# Patient Record
Sex: MALE | Race: WHITE | Employment: FULL TIME | URBAN - METROPOLITAN AREA
[De-identification: names, ages, dates, MRNs, and addresses within clinical notes are randomized per-mention and may not be internally consistent; named-entity substitution may affect disease eponyms.]

---

## 2018-10-25 ENCOUNTER — OFFICE VISIT (OUTPATIENT)
Dept: FAMILY MEDICINE | Facility: CLINIC | Age: 63
End: 2018-10-25
Payer: COMMERCIAL

## 2018-10-25 VITALS
TEMPERATURE: 98 F | HEART RATE: 80 BPM | SYSTOLIC BLOOD PRESSURE: 130 MMHG | BODY MASS INDEX: 33.13 KG/M2 | DIASTOLIC BLOOD PRESSURE: 78 MMHG | WEIGHT: 231.4 LBS | HEIGHT: 70 IN | RESPIRATION RATE: 16 BRPM

## 2018-10-25 DIAGNOSIS — Z00.00 WELL ADULT EXAM: ICD-10-CM

## 2018-10-25 DIAGNOSIS — Z23 NEED FOR PROPHYLACTIC VACCINATION AND INOCULATION AGAINST INFLUENZA: ICD-10-CM

## 2018-10-25 DIAGNOSIS — R94.4 ABNORMAL RENAL FUNCTION TEST: Primary | ICD-10-CM

## 2018-10-25 DIAGNOSIS — Z98.890 STATUS POST GASTRIC SURGERY: ICD-10-CM

## 2018-10-25 DIAGNOSIS — Z11.59 NEED FOR HEPATITIS C SCREENING TEST: ICD-10-CM

## 2018-10-25 DIAGNOSIS — Z12.5 SCREENING FOR PROSTATE CANCER: ICD-10-CM

## 2018-10-25 DIAGNOSIS — K21.9 GASTROESOPHAGEAL REFLUX DISEASE WITHOUT ESOPHAGITIS: Primary | ICD-10-CM

## 2018-10-25 LAB
ALBUMIN SERPL-MCNC: 3.6 G/DL (ref 3.4–5)
ALP SERPL-CCNC: 74 U/L (ref 40–150)
ALT SERPL W P-5'-P-CCNC: 50 U/L (ref 0–70)
ANION GAP SERPL CALCULATED.3IONS-SCNC: 6 MMOL/L (ref 3–14)
AST SERPL W P-5'-P-CCNC: 25 U/L (ref 0–45)
BASOPHILS # BLD AUTO: 0.1 10E9/L (ref 0–0.2)
BASOPHILS NFR BLD AUTO: 0.7 %
BILIRUB SERPL-MCNC: 0.6 MG/DL (ref 0.2–1.3)
BUN SERPL-MCNC: 21 MG/DL (ref 7–30)
CALCIUM SERPL-MCNC: 8.4 MG/DL (ref 8.5–10.1)
CHLORIDE SERPL-SCNC: 105 MMOL/L (ref 94–109)
CHOLEST SERPL-MCNC: 186 MG/DL
CO2 SERPL-SCNC: 26 MMOL/L (ref 20–32)
CREAT SERPL-MCNC: 1.65 MG/DL (ref 0.66–1.25)
DIFFERENTIAL METHOD BLD: NORMAL
EOSINOPHIL # BLD AUTO: 0.1 10E9/L (ref 0–0.7)
EOSINOPHIL NFR BLD AUTO: 1.6 %
ERYTHROCYTE [DISTWIDTH] IN BLOOD BY AUTOMATED COUNT: 14.4 % (ref 10–15)
FOLATE SERPL-MCNC: 43.3 NG/ML
GFR SERPL CREATININE-BSD FRML MDRD: 42 ML/MIN/1.7M2
GLUCOSE SERPL-MCNC: 83 MG/DL (ref 70–99)
HCT VFR BLD AUTO: 43.9 % (ref 40–53)
HDLC SERPL-MCNC: 34 MG/DL
HGB BLD-MCNC: 14.5 G/DL (ref 13.3–17.7)
IRON SERPL-MCNC: 91 UG/DL (ref 35–180)
LDLC SERPL CALC-MCNC: 121 MG/DL
LYMPHOCYTES # BLD AUTO: 1.8 10E9/L (ref 0.8–5.3)
LYMPHOCYTES NFR BLD AUTO: 23.9 %
MCH RBC QN AUTO: 27.6 PG (ref 26.5–33)
MCHC RBC AUTO-ENTMCNC: 33 G/DL (ref 31.5–36.5)
MCV RBC AUTO: 84 FL (ref 78–100)
MONOCYTES # BLD AUTO: 0.7 10E9/L (ref 0–1.3)
MONOCYTES NFR BLD AUTO: 8.8 %
NEUTROPHILS # BLD AUTO: 4.9 10E9/L (ref 1.6–8.3)
NEUTROPHILS NFR BLD AUTO: 65 %
NONHDLC SERPL-MCNC: 152 MG/DL
PLATELET # BLD AUTO: 267 10E9/L (ref 150–450)
POTASSIUM SERPL-SCNC: 5 MMOL/L (ref 3.4–5.3)
PROT SERPL-MCNC: 7.4 G/DL (ref 6.8–8.8)
PSA SERPL-ACNC: 3.37 UG/L (ref 0–4)
PTH-INTACT SERPL-MCNC: 96 PG/ML (ref 18–80)
RBC # BLD AUTO: 5.25 10E12/L (ref 4.4–5.9)
SODIUM SERPL-SCNC: 137 MMOL/L (ref 133–144)
TRIGL SERPL-MCNC: 156 MG/DL
VIT B12 SERPL-MCNC: 334 PG/ML (ref 193–986)
WBC # BLD AUTO: 7.5 10E9/L (ref 4–11)

## 2018-10-25 PROCEDURE — 83970 ASSAY OF PARATHORMONE: CPT | Performed by: NURSE PRACTITIONER

## 2018-10-25 PROCEDURE — 82306 VITAMIN D 25 HYDROXY: CPT | Performed by: NURSE PRACTITIONER

## 2018-10-25 PROCEDURE — 85025 COMPLETE CBC W/AUTO DIFF WBC: CPT | Performed by: NURSE PRACTITIONER

## 2018-10-25 PROCEDURE — 80061 LIPID PANEL: CPT | Performed by: NURSE PRACTITIONER

## 2018-10-25 PROCEDURE — 83540 ASSAY OF IRON: CPT | Performed by: NURSE PRACTITIONER

## 2018-10-25 PROCEDURE — 99386 PREV VISIT NEW AGE 40-64: CPT | Mod: 25 | Performed by: NURSE PRACTITIONER

## 2018-10-25 PROCEDURE — G0103 PSA SCREENING: HCPCS | Performed by: NURSE PRACTITIONER

## 2018-10-25 PROCEDURE — 99213 OFFICE O/P EST LOW 20 MIN: CPT | Mod: 25 | Performed by: NURSE PRACTITIONER

## 2018-10-25 PROCEDURE — 80053 COMPREHEN METABOLIC PANEL: CPT | Performed by: NURSE PRACTITIONER

## 2018-10-25 PROCEDURE — 36415 COLL VENOUS BLD VENIPUNCTURE: CPT | Performed by: NURSE PRACTITIONER

## 2018-10-25 PROCEDURE — 82746 ASSAY OF FOLIC ACID SERUM: CPT | Performed by: NURSE PRACTITIONER

## 2018-10-25 PROCEDURE — 82607 VITAMIN B-12: CPT | Performed by: NURSE PRACTITIONER

## 2018-10-25 PROCEDURE — 90682 RIV4 VACC RECOMBINANT DNA IM: CPT | Performed by: NURSE PRACTITIONER

## 2018-10-25 PROCEDURE — 90471 IMMUNIZATION ADMIN: CPT | Performed by: NURSE PRACTITIONER

## 2018-10-25 PROCEDURE — 86803 HEPATITIS C AB TEST: CPT | Performed by: NURSE PRACTITIONER

## 2018-10-25 ASSESSMENT — ENCOUNTER SYMPTOMS: MYALGIAS: 1

## 2018-10-25 ASSESSMENT — PAIN SCALES - GENERAL: PAINLEVEL: NO PAIN (0)

## 2018-10-25 NOTE — MR AVS SNAPSHOT
After Visit Summary   10/25/2018    Leandro Hodge    MRN: [id number]           Patient Information     Date Of Birth          1955        Visit Information        Provider Department      10/25/2018 9:00 AM Karlene Holder APRN [facility]        Today's Diagnoses     Gastroesophageal reflux disease without esophagitis    -  1    Well adult exam        Screening for prostate cancer        Need for hepatitis C screening test        Status post gastric surgery          Care Instructions      Go to pharmacy for shingles vaccine     Preventive Health Recommendations  Male Ages 50 - 64    Yearly exam:             See your health care provider every year in order to  o   Review health changes.   o   Discuss preventive care.    o   Review your medicines if your doctor has prescribed any.     Have a cholesterol test every 5 years, or more frequently if you are at risk for high cholesterol/heart disease.     Have a diabetes test (fasting glucose) every three years. If you are at risk for diabetes, you should have this test more often.     Have a colonoscopy at age 50, or have a yearly FIT test (stool test). These exams will check for colon cancer.      Talk with your health care provider about whether or not a prostate cancer screening test (PSA) is right for you.    You should be tested each year for STDs (sexually transmitted diseases), if you re at risk.     Shots: Get a flu shot each year. Get a tetanus shot every 10 years.     Nutrition:    Eat at least 5 servings of fruits and vegetables daily.     Eat whole-grain bread, whole-wheat pasta and brown rice instead of white grains and rice.     Get adequate Calcium and Vitamin D.     Lifestyle    Exercise for at least 150 minutes a week (30 minutes a day, 5 days a week). This will help you control your weight and prevent disease.     Limit alcohol to one drink per day.     No smoking.     Wear sunscreen to prevent skin  "cancer.     See your dentist every six months for an exam and cleaning.     See your eye doctor every 1 to 2 years.            Follow-ups after your visit        Who to contact     Normal or non-critical lab and imaging results will be communicated to you by MyChart, letter or phone within 4 business days after the clinic has received the results. If you do not hear from us within 7 days, please contact the clinic through MyChart or phone. If you have a critical or abnormal lab result, we will notify you by phone as soon as possible.  Submit refill requests through [employer] or call your pharmacy and they will forward the refill request to us. Please allow 3 business days for your refill to be completed.          If you need to speak with a  for additional information , please call: [phone]           Additional Information About Your Visit        Care EveryWhere ID     This is your Care EveryWhere ID. This could be used by other organizations to access your [facility] medical records  [id number]        Your Vitals Were     Pulse Temperature Respirations Height BMI (Body Mass Index)       80 98  F (36.7  C) (Tympanic) 16 5' 9.5\" (1.765 m) 33.68 kg/m2        Blood Pressure from Last 3 Encounters:   10/25/18 130/78    Weight from Last 3 Encounters:   10/25/18 231 lb 6.4 oz (105 kg)              We Performed the Following     CBC with platelets differential     Comprehensive metabolic panel     Folate     Hepatitis C Screen Reflex to HCV RNA Quant and Genotype     Iron     Lipid panel reflex to direct LDL Fasting     Parathyroid Hormone Intact     Prostate spec antigen screen     Vitamin B12     Vitamin D Deficiency          Today's Medication Changes          These changes are accurate as of 10/25/18  9:49 AM.  If you have any questions, ask your nurse or doctor.               These medicines have changed or have updated prescriptions.        Dose/Directions    * OMEPRAZOLE PO   This may have " changed:  Another medication with the same name was added. Make sure you understand how and when to take each.   Changed by:  Karlene Holder APRN CNP        Dose:  20 mg   Take 20 mg by mouth   Refills:  0       * omeprazole 20 MG CR capsule   Commonly known as:  priLOSEC   This may have changed:  You were already taking a medication with the same name, and this prescription was added. Make sure you understand how and when to take each.   Used for:  Gastroesophageal reflux disease without esophagitis   Changed by:  Karlene Holder APRN CNP        Take 1 tab every other day and as needed   Quantity:  90 capsule   Refills:  1       * Notice:  This list has 2 medication(s) that are the same as other medications prescribed for you. Read the directions carefully, and ask your doctor or other care provider to review them with you.         Where to get your medicines      These medications were sent to [id number] - [place] [[facility] [place] - [address]  [address] [address]     Phone:  [phone]     omeprazole 20 MG CR capsule                Primary Care Provider Office Phone # Fax #    [facility] [phone] [address] [facility] [address]        Equal Access to Services     YIN ZHANG : Hadii mireille barono Sogelyali, waaxda luqadaha, qaybta kaalmada adeegyada, tami marshall. So [facility] [phone].    ATENCIÓN: Si habla español, tiene a jones disposición servicios gratuitos de asistencia lingüística. Llame al [phone].    We comply with applicable federal civil rights laws and [place] laws. We do not discriminate on the basis of race, color, national origin, age, disability, sex, sexual orientation, or gender identity.            Thank you!     Thank you for choosing [facility]  for your care. Our goal is always to provide you with excellent care. Hearing back from our  patients is one way we can continue to improve our services. Please take a few minutes to complete the written survey that you may receive in the mail after your visit with us. Thank you!             Your Updated Medication List - Protect others around you: Learn how to safely use, store and throw away your medicines at www.disposemymeds.org.          This list is accurate as of 10/25/18  9:49 AM.  Always use your most recent med list.                   Brand Name Dispense Instructions for use Diagnosis    * OMEPRAZOLE PO      Take 20 mg by mouth        * omeprazole 20 MG CR capsule    priLOSEC    90 capsule    Take 1 tab every other day and as needed    Gastroesophageal reflux disease without esophagitis       VITAMIN B-12 PO           VITAMIN D PO           * Notice:  This list has 2 medication(s) that are the same as other medications prescribed for you. Read the directions carefully, and ask your doctor or other care provider to review them with you.

## 2018-10-25 NOTE — LETTER
October 29, 2018      Leandro Hodge  [address] [facility] [address] [facility] [address]        Dear ,    We are writing to inform you of your test results.Your parathyroid hormone is a bit elevated.  I am still waiting for your vitamin D level to return.  If your vitamin D level is on the lower end of a normal that could be the reason for the elevation in your parathyroid hormone. Your vitamin B12 and folate are normal.     Resulted Orders   CBC with platelets differential   Result Value Ref Range    WBC 7.5 4.0 - 11.0 10e9/L    RBC Count 5.25 4.4 - 5.9 10e12/L    Hemoglobin 14.5 13.3 - 17.7 g/dL    Hematocrit 43.9 40.0 - 53.0 %    MCV 84 78 - 100 fl    MCH 27.6 26.5 - 33.0 pg    MCHC 33.0 31.5 - 36.5 g/dL    RDW 14.4 10.0 - 15.0 %    Platelet Count 267 150 - 450 10e9/L    % Neutrophils 65.0 %    % Lymphocytes 23.9 %    % Monocytes 8.8 %    % Eosinophils 1.6 %    % Basophils 0.7 %    Absolute Neutrophil 4.9 1.6 - 8.3 10e9/L    Absolute Lymphocytes 1.8 0.8 - 5.3 10e9/L    Absolute Monocytes 0.7 0.0 - 1.3 10e9/L    Absolute Eosinophils 0.1 0.0 - 0.7 10e9/L    Absolute Basophils 0.1 0.0 - 0.2 10e9/L    Diff Method Automated Method    Comprehensive metabolic panel   Result Value Ref Range    Sodium 137 133 - 144 mmol/L    Potassium 5.0 3.4 - 5.3 mmol/L    Chloride 105 94 - 109 mmol/L    Carbon Dioxide 26 20 - 32 mmol/L    Anion Gap 6 3 - 14 mmol/L    Glucose 83 70 - 99 mg/dL      Comment:      Fasting specimen    Urea Nitrogen 21 7 - 30 mg/dL    Creatinine 1.65 (H) 0.66 - 1.25 mg/dL    GFR Estimate 42 (L) >60 mL/min/1.7m2      Comment:      Non  GFR Calc    GFR Estimate If Black 51 (L) >60 mL/min/1.7m2      Comment:       GFR Calc    Calcium 8.4 (L) 8.5 - 10.1 mg/dL    Bilirubin Total 0.6 0.2 - 1.3 mg/dL    Albumin 3.6 3.4 - 5.0 g/dL    Protein Total 7.4 6.8 - 8.8 g/dL    Alkaline Phosphatase 74 40 - 150 U/L    ALT 50 0 - 70 U/L    AST 25 0 - 45 U/L   Prostate spec antigen screen    Result Value Ref Range    PSA 3.37 0 - 4 ug/L      Comment:      Assay Method:  Chemiluminescence using Siemens Vista analyzer   Lipid panel reflex to direct LDL Fasting   Result Value Ref Range    Cholesterol 186 <200 mg/dL    Triglycerides 156 (H) <150 mg/dL      Comment:      Borderline high:  150-199 mg/dl  High:             200-499 mg/dl  Very high:       >499 mg/dl  Fasting specimen      HDL Cholesterol 34 (L) >39 mg/dL    LDL Cholesterol Calculated 121 (H) <100 mg/dL      Comment:      Above desirable:  100-129 mg/dl  Borderline High:  130-159 mg/dL  High:             160-189 mg/dL  Very high:       >189 mg/dl      Non HDL Cholesterol 152 (H) <130 mg/dL      Comment:      Above Desirable:  130-159 mg/dl  Borderline high:  160-189 mg/dl  High:             190-219 mg/dl  Very high:       >219 mg/dl     Folate   Result Value Ref Range    Folate 43.3 >5.4 ng/mL   Iron   Result Value Ref Range    Iron 91 35 - 180 ug/dL   Vitamin B12   Result Value Ref Range    Vitamin B12 334 193 - 986 pg/mL   Parathyroid Hormone Intact   Result Value Ref Range    Parathyroid Hormone Intact 96 (H) 18 - 80 pg/mL       If you have any questions or concerns, please call the clinic at the number listed above.       Sincerely,        Karlene Holder APRN CNP/ aj

## 2018-10-25 NOTE — Clinical Note
Please abstract the following data from this visit with this patient into the appropriate field in Epic:  Colonoscopy done on this date: 4/1/2010 (approximately), by this group: unsure, results were normal repeat 10 years.

## 2018-10-25 NOTE — LETTER
October 30, 2018      Leandro Hodge  [address] [facility] [address]            Leandro,     Your blood test for Hep C is negative.     Your vitamin D level is on the lower end of normal.  I would recommend taking over-the-counter vitamin D3 2000 units daily.      Component      Latest Ref Rng & Units 10/25/2018   Hepatitis C Antibody      NR:Nonreactive Nonreactive   Vitamin D Deficiency screening      20 - 75 ug/L 30       If you have any questions or concerns, please call the clinic at the number listed above.       Sincerely,        YOBANY Argueta CNP/ag

## 2018-10-25 NOTE — PROGRESS NOTES

## 2018-10-25 NOTE — PATIENT INSTRUCTIONS
Go to pharmacy for shingles vaccine     Preventive Health Recommendations  Male Ages 50 - 64    Yearly exam:             See your health care provider every year in order to  o   Review health changes.   o   Discuss preventive care.    o   Review your medicines if your doctor has prescribed any.     Have a cholesterol test every 5 years, or more frequently if you are at risk for high cholesterol/heart disease.     Have a diabetes test (fasting glucose) every three years. If you are at risk for diabetes, you should have this test more often.     Have a colonoscopy at age 50, or have a yearly FIT test (stool test). These exams will check for colon cancer.      Talk with your health care provider about whether or not a prostate cancer screening test (PSA) is right for you.    You should be tested each year for STDs (sexually transmitted diseases), if you re at risk.     Shots: Get a flu shot each year. Get a tetanus shot every 10 years.     Nutrition:    Eat at least 5 servings of fruits and vegetables daily.     Eat whole-grain bread, whole-wheat pasta and brown rice instead of white grains and rice.     Get adequate Calcium and Vitamin D.     Lifestyle    Exercise for at least 150 minutes a week (30 minutes a day, 5 days a week). This will help you control your weight and prevent disease.     Limit alcohol to one drink per day.     No smoking.     Wear sunscreen to prevent skin cancer.     See your dentist every six months for an exam and cleaning.     See your eye doctor every 1 to 2 years.

## 2018-10-25 NOTE — PROGRESS NOTES
SUBJECTIVE:   CC: Leandro Hodge is an 63 year old male who presents for preventative health visit.     Chief Complaint   Patient presents with     Physical     pt is fasting       Healthy Habits:    Do you get at least three servings of calcium containing foods daily (dairy, green leafy vegetables, etc.)? no, taking calcium and/or vitamin D supplement: yes - takes vitamin D3 supplement    Amount of exercise or daily activities, outside of work: none    Problems taking medications regularly No    Medication side effects: No    Have you had an eye exam in the past two years? yes    Do you see a dentist twice per year? yes    Do you have sleep apnea, excessive snoring or daytime drowsiness?no       Today's PHQ-2 Score:   PHQ-2 ( 1999 Pfizer) 10/25/2018   Q1: Little interest or pleasure in doing things 0   Q2: Feeling down, depressed or hopeless 0   PHQ-2 Score 0       Abuse: Current or Past(Physical, Sexual or Emotional)- No  Do you feel safe in your environment - Yes    Social History   Substance Use Topics     Smoking status: Former Smoker     Smokeless tobacco: Former User     Alcohol use Yes      Comment: very little      If you drink alcohol do you typically have >3 drinks per day or >7 drinks per week? No                      Last PSA: No results found for: PSA    Reviewed orders with patient. Reviewed health maintenance and updated orders accordingly - Yes  Current Outpatient Prescriptions   Medication Sig Dispense Refill     Cholecalciferol (VITAMIN D PO)        Cyanocobalamin (VITAMIN B-12 PO)        omeprazole (PRILOSEC) 20 MG CR capsule Take 1 tab every other day and as needed 90 capsule 1     OMEPRAZOLE PO Take 20 mg by mouth       No Known Allergies    Reviewed and updated as needed this visit by clinical staff  Tobacco  Allergies  Meds  Problems  Med Hx  Surg Hx  Fam Hx  Soc Hx          Reviewed and updated as needed this visit by Provider  Problems        Here today for physical and to  establish care.  Is fasting today for labs.  Has not had a physical in some time.  In 1976 had a gastric surgery for weight loss.  Has been having more muscle cramps.  Started taking vitamin B and vitamin D and the muscle cramps have decreased significantly but are still there occasionally.    Takes omeprazole every other day. Rarely has any burning. Will take Tums if needed.  Started it initially was having burning every day.    Last PSA: Never. No family history of prostate cancer.   Last Colonoscopy: 2010. Thinks was normal. [place] [address] [facility]. No family history of colon cancer  Last eye exam: every 1-2 years   Last dental exam: every 6 mo  Last tetanus vaccine: 2011  Last influenza vaccine: Plans to get here today   Last shingles vaccine: Never   Last pneumonia vaccine: Never   Hep C screen (born [birth year]): Do today    HIV screen: Never, declines   AAA screen (age 65-78 with smoking hx): N/A  IVD (HTN, Hyperlipid, Smoking): N/A  Lung CA screening (55-80, 30 pk smoking hx): N/A    ROS:  Review of Systems   Constitutional: Negative for diaphoresis, fatigue and fever.   HENT: Negative for congestion, ear pain, postnasal drip, rhinorrhea, sinus pressure and sore throat.    Eyes: Negative for discharge.   Respiratory: Negative for cough, chest tightness, shortness of breath and wheezing.    Cardiovascular: Negative for chest pain and palpitations.   Gastrointestinal: Negative for blood in stool, diarrhea, nausea and vomiting.        Burning and reflux    Genitourinary: Negative for dysuria, frequency and urgency.   Musculoskeletal: Positive for myalgias (muscle cramps). Negative for arthralgias.   Skin: Negative for rash.   Neurological: Negative for dizziness, light-headedness, numbness and headaches.   Hematological: Does not bruise/bleed easily.   Psychiatric/Behavioral: Negative for confusion.         OBJECTIVE:   /78 (BP Location: Right arm, Patient Position: Sitting, Cuff Size: Adult Large)  Pulse 80  " Temp 98  F (36.7  C) (Tympanic)  Resp 16  Ht 5' 9.5\" (1.765 m)  Wt 231 lb 6.4 oz (105 kg)  BMI 33.68 kg/m2  EXAM:  Physical Exam   Constitutional: He appears well-developed and well-nourished.   HENT:   Right Ear: Tympanic membrane and external ear normal.   Left Ear: Tympanic membrane and external ear normal.   Mouth/Throat: Uvula is midline, oropharynx is clear and moist and mucous membranes are normal.   Eyes: Pupils are equal, round, and reactive to light.   Neck: Carotid bruit is not present. No thyromegaly present.   Cardiovascular: Normal rate, regular rhythm and normal heart sounds.    Pulses:       Femoral pulses are 2+ on the right side, and 2+ on the left side.  Pulmonary/Chest: Effort normal and breath sounds normal.   Abdominal: Soft. Bowel sounds are normal. He exhibits no distension. There is no tenderness.   Musculoskeletal: Normal range of motion.   Neurological: He is alert.   Skin: Skin is warm and dry.   Psychiatric: He has a normal mood and affect.       ASSESSMENT/PLAN:   1. Gastroesophageal reflux disease without esophagitis  Discussed how to diminish symptoms. Patient understands that it may take 1-2 weeks to experience an improvement in symptoms  --Enc to stop smoking  --Stop alcohol or at least drink no more than one drink per day  --Avoid eating large meals, do not eat late at night   --Avoid foods that trigger   --Elevate head of bed 2-3 inches  --Eat frequently  --Continue with Tums as needed.  - omeprazole (PRILOSEC) 20 MG CR capsule; Take 1 tab every other day and as needed  Dispense: 90 capsule; Refill: 1    2. Well adult exam  Screening guidelines reviewed.   - CBC with platelets differential  - Comprehensive metabolic panel  - Lipid panel reflex to direct LDL Fasting    3. Screening for prostate cancer  - Prostate spec antigen screen    4. Need for hepatitis C screening test  - Hepatitis C Screen Reflex to HCV RNA Quant and Genotype    5. Status post gastric surgery  Will " "check additional labs here today   - Folate  - Iron  - Vitamin D Deficiency  - Vitamin B12  - Parathyroid Hormone Intact    6. Need for prophylactic vaccination and inoculation against influenza  Administer in clinic today  - FLU VACCINE, (RIV4) RECOMBINANT HA  , IM (FluBlok, egg free) [54558]- >18 YRS (FMG recommended  50-64 YRS)  - Vaccine Administration, Initial [74097]    COUNSELING:  Reviewed preventive health counseling, as reflected in patient instructions       Regular exercise       Healthy diet/nutrition       Vision screening       Immunizations    Vaccinated for: Influenza             Consider Hep C screening for patients born between 1945 and 1965       Colon cancer screening       Prostate cancer screening    BP Readings from Last 1 Encounters:   10/25/18 130/78     Estimated body mass index is 33.68 kg/(m^2) as calculated from the following:    Height as of this encounter: 5' 9.5\" (1.765 m).    Weight as of this encounter: 231 lb 6.4 oz (105 kg).      Weight management plan: Discussed healthy diet and exercise guidelines and patient will follow up in 12 months in clinic to re-evaluate.     reports that he has quit smoking. He has quit using smokeless tobacco.      Counseling Resources:  ATP IV Guidelines  Pooled Cohorts Equation Calculator  FRAX Risk Assessment  ICSI Preventive Guidelines  Dietary Guidelines for Americans, 2010  USDA's MyPlate  ASA Prophylaxis  Lung CA Screening    YOBANY Argueta [facility]"

## 2018-10-25 NOTE — LETTER
October 29, 2018      Leandro Hodge  [address] [facility] [address]        Dear ,    We are writing to inform you of your test results. Your kidney function is a bit elevated.  If you are taking any over the counter NSAIDs-ibuprofen, Advil, aspirin, Aleve please stop using them.  Please try and push fluids.  Should plan to recheck your kidney function in 1 week.  I have entered the lab order, you can call and make a lab only appointment for a date and time that works best for you in approximately 1 week. Your triglycerides and bad cholesterol are elevated. Need to try and increase your activity and have lower saturated fat lower carbohydrate diet. Should plan to recheck these again in 1 year. Your blood counts are all in normal range. Your electrolytes are all in normal range. Your prostate number is in range. Your liver function is normal. Your glucose (blood sugar) is in normal range.I am still waiting for a bunch of other labs to come back.    Resulted Orders   CBC with platelets differential   Result Value Ref Range    WBC 7.5 4.0 - 11.0 10e9/L    RBC Count 5.25 4.4 - 5.9 10e12/L    Hemoglobin 14.5 13.3 - 17.7 g/dL    Hematocrit 43.9 40.0 - 53.0 %    MCV 84 78 - 100 fl    MCH 27.6 26.5 - 33.0 pg    MCHC 33.0 31.5 - 36.5 g/dL    RDW 14.4 10.0 - 15.0 %    Platelet Count 267 150 - 450 10e9/L    % Neutrophils 65.0 %    % Lymphocytes 23.9 %    % Monocytes 8.8 %    % Eosinophils 1.6 %    % Basophils 0.7 %    Absolute Neutrophil 4.9 1.6 - 8.3 10e9/L    Absolute Lymphocytes 1.8 0.8 - 5.3 10e9/L    Absolute Monocytes 0.7 0.0 - 1.3 10e9/L    Absolute Eosinophils 0.1 0.0 - 0.7 10e9/L    Absolute Basophils 0.1 0.0 - 0.2 10e9/L    Diff Method Automated Method    Comprehensive metabolic panel   Result Value Ref Range    Sodium 137 133 - 144 mmol/L    Potassium 5.0 3.4 - 5.3 mmol/L    Chloride 105 94 - 109 mmol/L    Carbon Dioxide 26 20 - 32 mmol/L    Anion Gap 6 3 - 14 mmol/L    Glucose 83 70 - 99 mg/dL       Comment:      Fasting specimen    Urea Nitrogen 21 7 - 30 mg/dL    Creatinine 1.65 (H) 0.66 - 1.25 mg/dL    GFR Estimate 42 (L) >60 mL/min/1.7m2      Comment:      Non  GFR Calc    GFR Estimate If Black 51 (L) >60 mL/min/1.7m2      Comment:       GFR Calc    Calcium 8.4 (L) 8.5 - 10.1 mg/dL    Bilirubin Total 0.6 0.2 - 1.3 mg/dL    Albumin 3.6 3.4 - 5.0 g/dL    Protein Total 7.4 6.8 - 8.8 g/dL    Alkaline Phosphatase 74 40 - 150 U/L    ALT 50 0 - 70 U/L    AST 25 0 - 45 U/L   Prostate spec antigen screen   Result Value Ref Range    PSA 3.37 0 - 4 ug/L      Comment:      Assay Method:  Chemiluminescence using Siemens Vista analyzer   Lipid panel reflex to direct LDL Fasting   Result Value Ref Range    Cholesterol 186 <200 mg/dL    Triglycerides 156 (H) <150 mg/dL      Comment:      Borderline high:  150-199 mg/dl  High:             200-499 mg/dl  Very high:       >499 mg/dl  Fasting specimen      HDL Cholesterol 34 (L) >39 mg/dL    LDL Cholesterol Calculated 121 (H) <100 mg/dL      Comment:      Above desirable:  100-129 mg/dl  Borderline High:  130-159 mg/dL  High:             160-189 mg/dL  Very high:       >189 mg/dl      Non HDL Cholesterol 152 (H) <130 mg/dL      Comment:      Above Desirable:  130-159 mg/dl  Borderline high:  160-189 mg/dl  High:             190-219 mg/dl  Very high:       >219 mg/dl     Folate   Result Value Ref Range    Folate 43.3 >5.4 ng/mL   Iron   Result Value Ref Range    Iron 91 35 - 180 ug/dL   Vitamin B12   Result Value Ref Range    Vitamin B12 334 193 - 986 pg/mL   Parathyroid Hormone Intact   Result Value Ref Range    Parathyroid Hormone Intact 96 (H) 18 - 80 pg/mL       If you have any questions or concerns, please call the clinic at the number listed above.       Sincerely,        YOBANY Argueta CNP/ aj

## 2018-10-26 LAB
DEPRECATED CALCIDIOL+CALCIFEROL SERPL-MC: 30 UG/L (ref 20–75)
HCV AB SERPL QL IA: NONREACTIVE

## 2019-10-24 DIAGNOSIS — K21.9 GASTROESOPHAGEAL REFLUX DISEASE WITHOUT ESOPHAGITIS: ICD-10-CM

## 2019-10-24 NOTE — TELEPHONE ENCOUNTER
"Requested Prescriptions   Pending Prescriptions Disp Refills     omeprazole (PRILOSEC) 20 MG DR capsule [Pharmacy Med Name: Omeprazole Oral Capsule Delayed Release 20 MG] 45 capsule 0     Sig: take one capsule by mouth every other day and as needed  Last Written Prescription Date:  10/25/2018 #90 x 1  Last filled 08/02/2019 #45 x 0  Last office visit: 10/25/2018 AZALEA Holder     Future Office Visit:   Next 5 appointments (look out 90 days)    Oct 31, 2019  8:00 AM CDT  PHYSICAL with Karlene Holder, APRN CNP  [facility] [address] [facility] [address]  [phone]                PPI Protocol Passed - 10/24/2019  9:51 AM        Passed - Not on Clopidogrel (unless Pantoprazole ordered)        Passed - No diagnosis of osteoporosis on record        Passed - Recent (12 mo) or future (30 days) visit within the authorizing provider's specialty     Patient has had an office visit with the authorizing provider or a provider within the authorizing providers department within the previous 12 mos or has a future within next 30 days. See \"Patient Info\" tab in inbasket, or \"Choose Columns\" in Meds & Orders section of the refill encounter.              Passed - Medication is active on med list        Passed - Patient is age 18 or older          "

## 2019-10-31 ENCOUNTER — ANCILLARY PROCEDURE (OUTPATIENT)
Dept: GENERAL RADIOLOGY | Facility: CLINIC | Age: 64
End: 2019-10-31
Attending: NURSE PRACTITIONER
Payer: COMMERCIAL

## 2019-10-31 ENCOUNTER — OFFICE VISIT (OUTPATIENT)
Dept: FAMILY MEDICINE | Facility: CLINIC | Age: 64
End: 2019-10-31
Payer: COMMERCIAL

## 2019-10-31 VITALS
RESPIRATION RATE: 20 BRPM | DIASTOLIC BLOOD PRESSURE: 72 MMHG | TEMPERATURE: 97.8 F | HEIGHT: 71 IN | BODY MASS INDEX: 32 KG/M2 | HEART RATE: 76 BPM | SYSTOLIC BLOOD PRESSURE: 108 MMHG | WEIGHT: 228.6 LBS

## 2019-10-31 DIAGNOSIS — K21.9 GASTROESOPHAGEAL REFLUX DISEASE WITHOUT ESOPHAGITIS: ICD-10-CM

## 2019-10-31 DIAGNOSIS — Z12.11 SPECIAL SCREENING FOR MALIGNANT NEOPLASMS, COLON: ICD-10-CM

## 2019-10-31 DIAGNOSIS — M25.571 PAIN IN JOINT INVOLVING ANKLE AND FOOT, RIGHT: ICD-10-CM

## 2019-10-31 DIAGNOSIS — Z23 NEED FOR INFLUENZA VACCINATION: ICD-10-CM

## 2019-10-31 DIAGNOSIS — E78.5 HYPERLIPIDEMIA LDL GOAL <100: ICD-10-CM

## 2019-10-31 DIAGNOSIS — Z87.442 HISTORY OF KIDNEY STONES: ICD-10-CM

## 2019-10-31 DIAGNOSIS — N52.9 ERECTILE DYSFUNCTION, UNSPECIFIED ERECTILE DYSFUNCTION TYPE: ICD-10-CM

## 2019-10-31 DIAGNOSIS — Z12.5 SCREENING FOR PROSTATE CANCER: ICD-10-CM

## 2019-10-31 DIAGNOSIS — R39.198 DECREASED URINE STREAM: ICD-10-CM

## 2019-10-31 DIAGNOSIS — L60.0 INGROWN NAIL OF SECOND TOE OF LEFT FOOT: ICD-10-CM

## 2019-10-31 DIAGNOSIS — Z00.00 ROUTINE GENERAL MEDICAL EXAMINATION AT A HEALTH CARE FACILITY: ICD-10-CM

## 2019-10-31 DIAGNOSIS — E83.51 HYPOCALCEMIA: Primary | ICD-10-CM

## 2019-10-31 LAB
ALBUMIN SERPL-MCNC: 3.5 G/DL (ref 3.4–5)
ALP SERPL-CCNC: 73 U/L (ref 40–150)
ALT SERPL W P-5'-P-CCNC: 40 U/L (ref 0–70)
ANION GAP SERPL CALCULATED.3IONS-SCNC: 3 MMOL/L (ref 3–14)
AST SERPL W P-5'-P-CCNC: 29 U/L (ref 0–45)
BILIRUB SERPL-MCNC: 0.6 MG/DL (ref 0.2–1.3)
BUN SERPL-MCNC: 23 MG/DL (ref 7–30)
CALCIUM SERPL-MCNC: 8.5 MG/DL (ref 8.5–10.1)
CHLORIDE SERPL-SCNC: 109 MMOL/L (ref 94–109)
CHOLEST SERPL-MCNC: 165 MG/DL
CO2 SERPL-SCNC: 26 MMOL/L (ref 20–32)
CREAT SERPL-MCNC: 1.63 MG/DL (ref 0.66–1.25)
GFR SERPL CREATININE-BSD FRML MDRD: 44 ML/MIN/{1.73_M2}
GLUCOSE SERPL-MCNC: 88 MG/DL (ref 70–99)
HDLC SERPL-MCNC: 39 MG/DL
LDLC SERPL CALC-MCNC: 101 MG/DL
NONHDLC SERPL-MCNC: 126 MG/DL
POTASSIUM SERPL-SCNC: 4.9 MMOL/L (ref 3.4–5.3)
PROT SERPL-MCNC: 7 G/DL (ref 6.8–8.8)
PSA SERPL-ACNC: 3.43 UG/L (ref 0–4)
PTH-INTACT SERPL-MCNC: 88 PG/ML (ref 18–80)
SODIUM SERPL-SCNC: 138 MMOL/L (ref 133–144)
TRIGL SERPL-MCNC: 127 MG/DL

## 2019-10-31 PROCEDURE — 74019 RADEX ABDOMEN 2 VIEWS: CPT | Mod: FY

## 2019-10-31 PROCEDURE — 90682 RIV4 VACC RECOMBINANT DNA IM: CPT | Performed by: NURSE PRACTITIONER

## 2019-10-31 PROCEDURE — 99214 OFFICE O/P EST MOD 30 MIN: CPT | Mod: 25 | Performed by: NURSE PRACTITIONER

## 2019-10-31 PROCEDURE — 80053 COMPREHEN METABOLIC PANEL: CPT | Performed by: NURSE PRACTITIONER

## 2019-10-31 PROCEDURE — 73630 X-RAY EXAM OF FOOT: CPT | Mod: RT

## 2019-10-31 PROCEDURE — G0103 PSA SCREENING: HCPCS | Performed by: NURSE PRACTITIONER

## 2019-10-31 PROCEDURE — 82306 VITAMIN D 25 HYDROXY: CPT | Performed by: NURSE PRACTITIONER

## 2019-10-31 PROCEDURE — 80061 LIPID PANEL: CPT | Performed by: NURSE PRACTITIONER

## 2019-10-31 PROCEDURE — 99396 PREV VISIT EST AGE 40-64: CPT | Mod: 25 | Performed by: NURSE PRACTITIONER

## 2019-10-31 PROCEDURE — 36415 COLL VENOUS BLD VENIPUNCTURE: CPT | Performed by: NURSE PRACTITIONER

## 2019-10-31 PROCEDURE — 83970 ASSAY OF PARATHORMONE: CPT | Performed by: NURSE PRACTITIONER

## 2019-10-31 PROCEDURE — 90471 IMMUNIZATION ADMIN: CPT | Performed by: NURSE PRACTITIONER

## 2019-10-31 RX ORDER — CEPHALEXIN 500 MG/1
500 CAPSULE ORAL 4 TIMES DAILY
Qty: 40 CAPSULE | Refills: 0 | Status: SHIPPED | OUTPATIENT
Start: 2019-10-31 | End: 2019-11-10

## 2019-10-31 ASSESSMENT — ENCOUNTER SYMPTOMS
DIARRHEA: 0
DIARRHEA: 0
LIGHT-HEADEDNESS: 0
WHEEZING: 0
FATIGUE: 0
ARTHRALGIAS: 0
CHEST TIGHTNESS: 0
DYSURIA: 0
DIZZINESS: 0
CONFUSION: 0
FATIGUE: 0
NUMBNESS: 0
SORE THROAT: 0
HEADACHES: 0
EYE DISCHARGE: 0
SHORTNESS OF BREATH: 0
FEVER: 0
DYSURIA: 0
PALPITATIONS: 0
COUGH: 0
DIAPHORESIS: 0
NAUSEA: 0
NAUSEA: 0
BRUISES/BLEEDS EASILY: 0
SHORTNESS OF BREATH: 0
FREQUENCY: 0
ARTHRALGIAS: 1
BRUISES/BLEEDS EASILY: 0
LIGHT-HEADEDNESS: 0
DIAPHORESIS: 0
MYALGIAS: 0
PALPITATIONS: 0
DIZZINESS: 0
CONFUSION: 0
FEVER: 0
WHEEZING: 0
SORE THROAT: 0
CHEST TIGHTNESS: 0
NUMBNESS: 0
COUGH: 0
SINUS PRESSURE: 0
BLOOD IN STOOL: 0
RHINORRHEA: 0
VOMITING: 0
HEADACHES: 0
FREQUENCY: 0
BLOOD IN STOOL: 0
RHINORRHEA: 0
EYE DISCHARGE: 0
VOMITING: 0
SINUS PRESSURE: 0

## 2019-10-31 ASSESSMENT — MIFFLIN-ST. JEOR: SCORE: 1841.11

## 2019-10-31 ASSESSMENT — PAIN SCALES - GENERAL: PAINLEVEL: NO PAIN (0)

## 2019-10-31 NOTE — PATIENT INSTRUCTIONS
Check with insurance to see if shingles vaccine is covered.     Preventive Health Recommendations  Male Ages 50 - 64    Yearly exam:             See your health care provider every year in order to  o   Review health changes.   o   Discuss preventive care.    o   Review your medicines if your doctor has prescribed any.     Have a cholesterol test every 5 years, or more frequently if you are at risk for high cholesterol/heart disease.     Have a diabetes test (fasting glucose) every three years. If you are at risk for diabetes, you should have this test more often.     Have a colonoscopy at age 50, or have a yearly FIT test (stool test). These exams will check for colon cancer.      Talk with your health care provider about whether or not a prostate cancer screening test (PSA) is right for you.    You should be tested each year for STDs (sexually transmitted diseases), if you re at risk.     Shots: Get a flu shot each year. Get a tetanus shot every 10 years.     Nutrition:    Eat at least 5 servings of fruits and vegetables daily.     Eat whole-grain bread, whole-wheat pasta and brown rice instead of white grains and rice.     Get adequate Calcium and Vitamin D.     Lifestyle    Exercise for at least 150 minutes a week (30 minutes a day, 5 days a week). This will help you control your weight and prevent disease.     Limit alcohol to one drink per day.     No smoking.     Wear sunscreen to prevent skin cancer.     See your dentist every six months for an exam and cleaning.     See your eye doctor every 1 to 2 years.

## 2019-10-31 NOTE — PROGRESS NOTES
3  SUBJECTIVE:   CC: Leandro Hodge is an 64 year old male who presents for preventive health visit.     Chief Complaint   Patient presents with     Physical     pt is fasting       Healthy Habits:    Do you get at least three servings of calcium containing foods daily (dairy, green leafy vegetables, etc.)? no, taking calcium and/or vitamin D supplement: yes    Amount of exercise or daily activities, outside of work: none currently    Problems taking medications regularly No    Medication side effects: No    Have you had an eye exam in the past two years? yes    Do you see a dentist twice per year? yes    Do you have sleep apnea, excessive snoring or daytime drowsiness?no    Second toe on left foot painful. Concerned he has ingrown toenail.    Right foot feels numb and swollen sometimes. Wearing shoes helps. Wears slippers in house.     Unable to achieve erection. Has tried Viagra in the past without improvement.     Today's PHQ-2 Score:   PHQ-2 ( 1999 [employer]) 10/31/2019 10/25/2018   Q1: Little interest or pleasure in doing things 0 0   Q2: Feeling down, depressed or hopeless 0 0   PHQ-2 Score 0 0       Abuse: Current or Past(Physical, Sexual or Emotional)- No  Do you feel safe in your environment? Yes    Have you ever done Advance Care Planning? (For example, a Health Directive, POLST, or a discussion with a medical provider about your wishes): No-pt declines information today    Social History     Tobacco Use     Smoking status: Former Smoker     Packs/day: 0.00     Smokeless tobacco: Former User   Substance Use Topics     Alcohol use: Yes     Comment: very little     If you drink alcohol do you typically have >3 drinks per day or >7 drinks per week? No                      Last PSA:   PSA   Date Value Ref Range Status   10/25/2018 3.37 0 - 4 ug/L Final     Comment:     Assay Method:  Chemiluminescence using Siemens Vista analyzer       Reviewed orders with patient. Reviewed health maintenance and updated  orders accordingly - Yes  Current Outpatient Medications   Medication Sig Dispense Refill     cephALEXin (KEFLEX) 500 MG capsule Take 1 capsule (500 mg) by mouth 4 times daily for 10 days 40 capsule 0     Cholecalciferol (VITAMIN D PO)        Cyanocobalamin (VITAMIN B-12 PO)        omeprazole (PRILOSEC) 20 MG DR capsule take one capsule by mouth every other day and as needed 45 capsule 1     No Known Allergies    Reviewed and updated as needed this visit by clinical staff  Tobacco  Allergies  Meds  Med Hx  Surg Hx  Fam Hx  Soc Hx        Reviewed and updated as needed this visit by Provider        Here today for physical.  Is fasting today for labs.  Is currently taking 5000 units of Vit D    Has had issues with kidneys in the past. Was told that had a kidney stone in there. CT in 2014-unable to view results. Unsure what side it was on.  Not currently having any pain.    Continues to take omeprazole every other day. Occasionally will take some Tums. Is taking Tums 1-2 per month.  Noticed increase in heartburn with certain foods    Left toenail, thinks maybe ingrown. Feels like is growing on top of the other. Really started to hurt about 2 weeks ago. No fevers or chills. Has felt a bit off this week, unsure why.     Right foot feels stiff. Will feel sprained at times. Did play football back in , and did hurt this ankle many times. Will feel swollen, but then when looks at it, is not swollen.     Has been having trouble achieving and erection. Will get some stiff but will not stay. Has had prescription  For Viagra in the past and that really did not work. Does not have a real active stress life. Is active in golfing. Does go to [employer] and likes to lift weights. Currently due to toe is not able to do treadmill. Likes to go about 4 times per week. Last time tat tried Viagra was about 5 years ago.  No chest pain, palpitations. If does climb a lot of stairs will get some short of breath, but only takes a few  "seconds to recover. Up to BR a couple of times per night, will only go a bit, urine stream is a bit decreased.       Last PSA: Never. No family history of prostate cancer.   Last Colonoscopy: 2011. Thinks was normal. Off [address] near [facility]. No family history of colon cancer  Last eye exam: every 1-2 years   Last dental exam: every 6 mo  Last tetanus vaccine: 2011  Last influenza vaccine: Plans to get here today   Last shingles vaccine: Never   Last pneumonia vaccine: Never   Hep C screen (born [birth year]): 10/18-neg  HIV screen: Declines-low risk  AAA screen (age 65-78 with smoking hx): N/A  IVD (HTN, Hyperlipid, Smoking): N/A  Lung CA screening (55-80, 30 pk smoking hx): N/A    ROS:  Review of Systems   Constitutional: Negative for diaphoresis, fatigue and fever.   HENT: Negative for congestion, ear pain, postnasal drip, rhinorrhea, sinus pressure and sore throat.    Eyes: Negative for discharge.   Respiratory: Negative for cough, chest tightness, shortness of breath and wheezing.    Cardiovascular: Negative for chest pain and palpitations.   Gastrointestinal: Negative for blood in stool, diarrhea, nausea and vomiting.   Genitourinary: Negative for dysuria, frequency and urgency.        ED  Decreased urine stream     Musculoskeletal: Positive for arthralgias (right ankle pain). Negative for myalgias.   Skin: Negative for rash.        2nd toe left foot hurts and is red     Neurological: Negative for dizziness, light-headedness, numbness and headaches.   Hematological: Does not bruise/bleed easily.   Psychiatric/Behavioral: Negative for confusion.         OBJECTIVE:   /72 (BP Location: Left arm, Patient Position: Sitting, Cuff Size: Adult Large)   Pulse 76   Temp 97.8  F (36.6  C) (Tympanic)   Resp 20   Ht 1.791 m (5' 10.5\")   Wt 103.7 kg (228 lb 9.6 oz)   BMI 32.34 kg/m    EXAM:  Physical Exam  Constitutional:       Appearance: He is well-developed.   HENT:      Right Ear: Tympanic membrane and external ear " normal. No middle ear effusion. Tympanic membrane is not erythematous.      Left Ear: Tympanic membrane and external ear normal.  No middle ear effusion. Tympanic membrane is not erythematous.      Mouth/Throat:      Pharynx: Uvula midline.   Eyes:      Pupils: Pupils are equal, round, and reactive to light.   Neck:      Thyroid: No thyromegaly.      Vascular: No carotid bruit.   Cardiovascular:      Rate and Rhythm: Normal rate and regular rhythm.      Pulses:           Femoral pulses are 2+ on the right side and 2+ on the left side.     Heart sounds: Normal heart sounds.   Pulmonary:      Effort: Pulmonary effort is normal.      Breath sounds: Normal breath sounds.   Abdominal:      General: Bowel sounds are normal. There is no distension.      Palpations: Abdomen is soft.      Tenderness: There is no tenderness.   Musculoskeletal:      Right ankle: He exhibits decreased range of motion. He exhibits no swelling, no ecchymosis and no deformity. Tenderness. Medial malleolus tenderness found.        Feet:    Skin:     General: Skin is warm and dry.   Neurological:      Mental Status: He is alert.         ASSESSMENT/PLAN:   1. Hypocalcemia  We will recheck labs here today   - Comprehensive metabolic panel  - Vitamin D Deficiency  - Parathyroid Hormone Intact    2. Hyperlipidemia LDL goal <100  We will recheck fasting labs here today  - Lipid panel reflex to direct LDL Fasting    3. Screening for prostate cancer  We will recheck labs here today  - Prostate spec antigen screen    4. Routine general medical examination at a health care facility  Screening guidelines reviewed.     5. Gastroesophageal reflux disease without esophagitis  Doing well on current.  Continue every other day.    - omeprazole (PRILOSEC) 20 MG DR capsule; take one capsule by mouth every other day and as needed  Dispense: 45 capsule; Refill: 1    6. Pain in joint involving ankle and foot, right  Will refer to podiatry  - XR Foot Right G/E 3 Views;  "Future  - PODIATRY/FOOT & ANKLE SURGERY REFERRAL    7. Ingrown nail of second toe of left foot  Plan to refer to podiatry for removal.  Educated on use of antibiotic.  Encouraged warm foot soaks.  - cephALEXin (KEFLEX) 500 MG capsule; Take 1 capsule (500 mg) by mouth 4 times daily for 10 days  Dispense: 40 capsule; Refill: 0  - PODIATRY/FOOT & ANKLE SURGERY REFERRAL    8. Need for influenza vaccination  Administered today in clinic  - INFLUENZA QUAD, RECOMBINANT, P-FREE (RIV4) (FLUBLOCK) [49269]  - Vaccine Administration, Initial [88117]    9. Special screening for malignant neoplasms, colon  We will plan to set up for repeat colonoscopy  - GASTROENTEROLOGY ADULT REF PROCEDURE ONLY    10. History of kidney stones  Obtain imaging.  Will refer to urology  - XR KUB; Future  - UROLOGY ADULT REFERRAL    11. Decreased urine stream  - UROLOGY ADULT REFERRAL    12. Erectile dysfunction, unspecified erectile dysfunction type  - UROLOGY ADULT REFERRAL    COUNSELING:  Reviewed preventive health counseling, as reflected in patient instructions       Regular exercise       Healthy diet/nutrition       Immunizations    Vaccinated for: Influenza             HIV screeninx in teen years, 1x in adult years, and at intervals if high risk       Colon cancer screening       Prostate cancer screening    Estimated body mass index is 32.34 kg/m  as calculated from the following:    Height as of this encounter: 1.791 m (5' 10.5\").    Weight as of this encounter: 103.7 kg (228 lb 9.6 oz).    Weight management plan: Discussed healthy diet and exercise guidelines     reports that he has quit smoking. He smoked 0.00 packs per day. He has quit using smokeless tobacco.      Counseling Resources:  ATP IV Guidelines  Pooled Cohorts Equation Calculator  FRAX Risk Assessment  ICSI Preventive Guidelines  Dietary Guidelines for Americans, 2010  USDA's MyPlate  ASA Prophylaxis  Lung CA Screening    Karlene Holder, YOBANY [facility] " [facility]

## 2019-11-01 DIAGNOSIS — R94.4 ABNORMAL RENAL FUNCTION TEST: Primary | ICD-10-CM

## 2019-11-01 LAB — DEPRECATED CALCIDIOL+CALCIFEROL SERPL-MC: 31 UG/L (ref 20–75)

## 2019-11-08 ENCOUNTER — OFFICE VISIT (OUTPATIENT)
Dept: PODIATRY | Facility: CLINIC | Age: 64
End: 2019-11-08
Payer: COMMERCIAL

## 2019-11-08 VITALS
SYSTOLIC BLOOD PRESSURE: 125 MMHG | HEART RATE: 86 BPM | HEIGHT: 71 IN | DIASTOLIC BLOOD PRESSURE: 80 MMHG | BODY MASS INDEX: 31.92 KG/M2 | WEIGHT: 228 LBS

## 2019-11-08 DIAGNOSIS — L60.0 ONYCHOMYCOSIS WITH INGROWN TOENAIL: ICD-10-CM

## 2019-11-08 DIAGNOSIS — Q66.72 PES CAVUS OF LEFT FOOT: Primary | ICD-10-CM

## 2019-11-08 DIAGNOSIS — Q66.71 PES CAVUS OF RIGHT FOOT: ICD-10-CM

## 2019-11-08 DIAGNOSIS — B35.1 ONYCHOMYCOSIS WITH INGROWN TOENAIL: ICD-10-CM

## 2019-11-08 DIAGNOSIS — M77.51 RIGHT ANKLE TENDONITIS: ICD-10-CM

## 2019-11-08 PROCEDURE — 11720 DEBRIDE NAIL 1-5: CPT | Performed by: PODIATRIST

## 2019-11-08 PROCEDURE — 99203 OFFICE O/P NEW LOW 30 MIN: CPT | Mod: 25 | Performed by: PODIATRIST

## 2019-11-08 ASSESSMENT — MIFFLIN-ST. JEOR: SCORE: 1838.39

## 2019-11-08 NOTE — NURSING NOTE
"Chief Complaint   Patient presents with     Ingrown Toenail     left 2cd toenail     Consult     \"weird feeling in right ankle/foot\" \"chipped a bone in ankle in early 80\"s\", XR taken 10/31/19       Initial /80   Pulse 86   Ht 1.791 m (5' 10.5\")   Wt 103.4 kg (228 lb)   BMI 32.25 kg/m   Estimated body mass index is 32.25 kg/m  as calculated from the following:    Height as of this encounter: 1.791 m (5' 10.5\").    Weight as of this encounter: 103.4 kg (228 lb).  Medications and allergies reviewed.      Michela LEE MA    "

## 2019-11-08 NOTE — PROGRESS NOTES
PATIENT HISTORY:  Leandro Hodge is a 64 year old male who presents to clinic for a painful left foot .  The patient describes the pain as sharp aching.  The patient relates the pain level is moderate.  The patient relates pain is located on the second toe left foot.  The patient relates the pain has been present for the past several weeks.  The patient relates the nail is ingrown.  Patient has been treated with oral antibiotics.    REVIEW OF SYSTEMS:  Constitutional, HEENT, cardiovascular, pulmonary, GI, , musculoskeletal, neuro, skin, endocrine and psych systems are negative, except as otherwise noted.     PAST MEDICAL HISTORY: No past medical history on file.     PAST SURGICAL HISTORY:   Past Surgical History:   Procedure Laterality Date     APPENDECTOMY       gastic surgery      for weight loss in 1976        MEDICATIONS:   Current Outpatient Medications:      cephALEXin (KEFLEX) 500 MG capsule, Take 1 capsule (500 mg) by mouth 4 times daily for 10 days, Disp: 40 capsule, Rfl: 0     Cholecalciferol (VITAMIN D PO), , Disp: , Rfl:      Cyanocobalamin (VITAMIN B-12 PO), , Disp: , Rfl:      omeprazole (PRILOSEC) 20 MG DR capsule, take one capsule by mouth every other day and as needed, Disp: 45 capsule, Rfl: 1     ALLERGIES:  No Known Allergies     SOCIAL HISTORY:   Social History     Socioeconomic History     Marital status:      Spouse name: Not on file     Number of children: Not on file     Years of education: Not on file     Highest education level: Not on file   Occupational History     Not on file   Social Needs     Financial resource strain: Not on file     Food insecurity:     Worry: Not on file     Inability: Not on file     Transportation needs:     Medical: Not on file     Non-medical: Not on file   Tobacco Use     Smoking status: Former Smoker     Packs/day: 0.00     Smokeless tobacco: Former User   Substance and Sexual Activity     Alcohol use: Yes     Comment: very little     Drug use: No  "    Sexual activity: Not Currently   Lifestyle     Physical activity:     Days per week: Not on file     Minutes per session: Not on file     Stress: Not on file   Relationships     Social connections:     Talks on phone: Not on file     Gets together: Not on file     Attends Yazidism service: Not on file     Active member of club or organization: Not on file     Attends meetings of clubs or organizations: Not on file     Relationship status: Not on file     Intimate partner violence:     Fear of current or ex partner: Not on file     Emotionally abused: Not on file     Physically abused: Not on file     Forced sexual activity: Not on file   Other Topics Concern     Parent/sibling w/ CABG, MI or angioplasty before 65F 55M? Not Asked   Social History Narrative     Not on file        FAMILY HISTORY:   Family History   Problem Relation Age of Onset     Hypertension Mother      Cerebrovascular Disease Mother      Alzheimer Disease Mother      Unknown/Adopted Father          at age 50     Alzheimer Disease Maternal Grandmother      Diabetes Paternal Grandmother      Hypertension Brother      Coronary Artery Disease Brother         MI with stent        EXAM:Vitals: /80   Pulse 86   Ht 1.791 m (5' 10.5\")   Wt 103.4 kg (228 lb)   BMI 32.25 kg/m    BMI= Body mass index is 32.25 kg/m .    Weight management plan: Patient was referred to their PCP to discuss a diet and exercise plan.    General appearance: Patient is alert and fully cooperative with history & exam.  No sign of distress is noted during the visit.     Psychiatric: Affect is pleasant & appropriate.  Patient appears motivated to improve health.     Respiratory: Breathing is regular & unlabored while sitting.     HEENT: Hearing is intact to spoken word.  Speech is clear.  No gross evidence of visual impairment that would impact ambulation.     Dermatologic: Skin is intact to left lower extremities without significant lesions, rash or abrasion.  No " paronychia or evidence of soft tissue infection is noted.     Vascular: DP & PT pulses are intact & regular on the left.  No significant edema or varicosities noted.  CFT and skin temperature is normal to the left lower extremities.     Neurologic: Lower extremity sensation is intact to light touch.  No evidence of weakness or contracture in the left lower extremities.  No evidence of neuropathy.     Musculoskeletal: Patient is ambulatory without assistive device or brace.  No gross ankle deformity noted.  No foot or ankle joint effusion is noted.  On notes a pes cavus foot deformity.  Pain on palpation over the anterior talofibular ligament bilaterally.    Radiographs from October 31, 2019 were evaluated including AP, lateral and medial oblique views of the right foot reveals  no cortical erosions or periosteal elevation.  All joint margins appear stable.  There is no apparent fracture or tumor formation noted.  There is no evidence of foreign body.    ASSESSMENT / PLAN:     ICD-10-CM    1. Pes cavus of left foot Q66.72        I have explained to Leandro  about the conditions.  We discussed the nature of the condition as well as the treatment plan and expected length of recovery.  At this time, the patient was instructed on icing, stretching, tissue massage and support.  The patient was referred to [facility] for custom orthotics that will aid in offloading the tension forces to the soft tissues and prevent further inflammation.  The toenail on the left second toe was sharply debrided with a nail nipper down to normal length and thickness.  The patient will return in four weeks for reevaluation if the symptoms do not resolve.      Leandro verbalized agreement with and understanding of the rational for the diagnosis and treatment plan.  All questions were answered to best of my ability and the patient's satisfaction. The patient was advised to contact the clinic with any questions that may  arise after the clinic visit.      Disclaimer: This note consists of symbols derived from keyboarding, dictation and/or voice recognition software. As a result, there may be errors in the script that have gone undetected. Please consider this when interpreting information found in this chart.       RADHA Kapadia D.P.M., TENISHA.MARYANNE.

## 2019-11-08 NOTE — LETTER
11/8/2019         RE: Leandro Hodge  [address] [facility] [address]        Dear Colleague,    Thank you for referring your patient, Leandro Hodge, to the [facility]. Please see a copy of my visit note below.    PATIENT HISTORY:  Leandro Hodge is a 64 year old male who presents to clinic for a painful left foot .  The patient describes the pain as sharp aching.  The patient relates the pain level is moderate.  The patient relates pain is located on the second toe left foot.  The patient relates the pain has been present for the past several weeks.  The patient relates the nail is ingrown.  Patient has been treated with oral antibiotics.    REVIEW OF SYSTEMS:  Constitutional, HEENT, cardiovascular, pulmonary, GI, , musculoskeletal, neuro, skin, endocrine and psych systems are negative, except as otherwise noted.     PAST MEDICAL HISTORY: No past medical history on file.     PAST SURGICAL HISTORY:   Past Surgical History:   Procedure Laterality Date     APPENDECTOMY       gastic surgery      for weight loss in 1976        MEDICATIONS:   Current Outpatient Medications:      cephALEXin (KEFLEX) 500 MG capsule, Take 1 capsule (500 mg) by mouth 4 times daily for 10 days, Disp: 40 capsule, Rfl: 0     Cholecalciferol (VITAMIN D PO), , Disp: , Rfl:      Cyanocobalamin (VITAMIN B-12 PO), , Disp: , Rfl:      omeprazole (PRILOSEC) 20 MG DR capsule, take one capsule by mouth every other day and as needed, Disp: 45 capsule, Rfl: 1     ALLERGIES:  No Known Allergies     SOCIAL HISTORY:   Social History     Socioeconomic History     Marital status:      Spouse name: Not on file     Number of children: Not on file     Years of education: Not on file     Highest education level: Not on file   Occupational History     Not on file   Social Needs     Financial resource strain: Not on file     Food insecurity:     Worry: Not on file     Inability: Not on file     Transportation needs:      "Medical: Not on file     Non-medical: Not on file   Tobacco Use     Smoking status: Former Smoker     Packs/day: 0.00     Smokeless tobacco: Former User   Substance and Sexual Activity     Alcohol use: Yes     Comment: very little     Drug use: No     Sexual activity: Not Currently   Lifestyle     Physical activity:     Days per week: Not on file     Minutes per session: Not on file     Stress: Not on file   Relationships     Social connections:     Talks on phone: Not on file     Gets together: Not on file     Attends Anabaptist service: Not on file     Active member of club or organization: Not on file     Attends meetings of clubs or organizations: Not on file     Relationship status: Not on file     Intimate partner violence:     Fear of current or ex partner: Not on file     Emotionally abused: Not on file     Physically abused: Not on file     Forced sexual activity: Not on file   Other Topics Concern     Parent/sibling w/ CABG, MI or angioplasty before 65F 55M? Not Asked   Social History Narrative     Not on file        FAMILY HISTORY:   Family History   Problem Relation Age of Onset     Hypertension Mother      Cerebrovascular Disease Mother      Alzheimer Disease Mother      Unknown/Adopted Father          at age 50     Alzheimer Disease Maternal Grandmother      Diabetes Paternal Grandmother      Hypertension Brother      Coronary Artery Disease Brother         MI with stent        EXAM:Vitals: /80   Pulse 86   Ht 1.791 m (5' 10.5\")   Wt 103.4 kg (228 lb)   BMI 32.25 kg/m     BMI= Body mass index is 32.25 kg/m .    Weight management plan: Patient was referred to their PCP to discuss a diet and exercise plan.    General appearance: Patient is alert and fully cooperative with history & exam.  No sign of distress is noted during the visit.     Psychiatric: Affect is pleasant & appropriate.  Patient appears motivated to improve health.     Respiratory: Breathing is regular & unlabored while " sitting.     HEENT: Hearing is intact to spoken word.  Speech is clear.  No gross evidence of visual impairment that would impact ambulation.     Dermatologic: Skin is intact to left lower extremities without significant lesions, rash or abrasion.  No paronychia or evidence of soft tissue infection is noted.     Vascular: DP & PT pulses are intact & regular on the left.  No significant edema or varicosities noted.  CFT and skin temperature is normal to the left lower extremities.     Neurologic: Lower extremity sensation is intact to light touch.  No evidence of weakness or contracture in the left lower extremities.  No evidence of neuropathy.     Musculoskeletal: Patient is ambulatory without assistive device or brace.  No gross ankle deformity noted.  No foot or ankle joint effusion is noted.  On notes a pes cavus foot deformity.  Pain on palpation over the anterior talofibular ligament bilaterally.    Radiographs from October 31, 2019 were evaluated including AP, lateral and medial oblique views of the right foot reveals  no cortical erosions or periosteal elevation.  All joint margins appear stable.  There is no apparent fracture or tumor formation noted.  There is no evidence of foreign body.    ASSESSMENT / PLAN:     ICD-10-CM    1. Pes cavus of left foot Q66.72        I have explained to Leandro  about the conditions.  We discussed the nature of the condition as well as the treatment plan and expected length of recovery.  At this time, the patient was instructed on icing, stretching, tissue massage and support.  The patient was referred to [facility] for custom orthotics that will aid in offloading the tension forces to the soft tissues and prevent further inflammation.  The toenail on the left second toe was sharply debrided with a nail nipper down to normal length and thickness.  The patient will return in four weeks for reevaluation if the symptoms do not resolve.      Leandro  verbalized agreement with and understanding of the rational for the diagnosis and treatment plan.  All questions were answered to best of my ability and the patient's satisfaction. The patient was advised to contact the clinic with any questions that may arise after the clinic visit.      Disclaimer: This note consists of symbols derived from keyboarding, dictation and/or voice recognition software. As a result, there may be errors in the script that have gone undetected. Please consider this when interpreting information found in this chart.       ROSA [email or web address]., F.A.C.F.A.S.      Again, thank you for allowing me to participate in the care of your patient.        Sincerely,        Crescencio Kapadia DPM

## 2020-06-27 DIAGNOSIS — K21.9 GASTROESOPHAGEAL REFLUX DISEASE WITHOUT ESOPHAGITIS: ICD-10-CM

## 2021-03-06 ENCOUNTER — IMMUNIZATION (OUTPATIENT)
Dept: NURSING | Facility: CLINIC | Age: 66
End: 2021-03-06
Payer: MEDICARE

## 2021-03-06 PROCEDURE — 0031A PR COVID VAC JANSSEN AD26 0.5ML: CPT

## 2021-03-06 PROCEDURE — 91303 PR COVID VAC JANSSEN AD26 0.5ML: CPT

## 2021-04-14 ENCOUNTER — OFFICE VISIT (OUTPATIENT)
Dept: FAMILY MEDICINE | Facility: CLINIC | Age: 66
End: 2021-04-14
Payer: MEDICARE

## 2021-04-14 VITALS
HEIGHT: 70 IN | TEMPERATURE: 97.4 F | WEIGHT: 235 LBS | DIASTOLIC BLOOD PRESSURE: 80 MMHG | SYSTOLIC BLOOD PRESSURE: 130 MMHG | RESPIRATION RATE: 20 BRPM | BODY MASS INDEX: 33.64 KG/M2 | HEART RATE: 80 BPM

## 2021-04-14 DIAGNOSIS — Z12.11 SCREEN FOR COLON CANCER: ICD-10-CM

## 2021-04-14 DIAGNOSIS — Z23 NEED FOR VACCINATION: ICD-10-CM

## 2021-04-14 DIAGNOSIS — K21.9 GASTROESOPHAGEAL REFLUX DISEASE WITHOUT ESOPHAGITIS: ICD-10-CM

## 2021-04-14 DIAGNOSIS — N18.9 CHRONIC KIDNEY DISEASE, UNSPECIFIED CKD STAGE: ICD-10-CM

## 2021-04-14 DIAGNOSIS — Z98.84 STATUS POST GASTRIC BYPASS FOR OBESITY: ICD-10-CM

## 2021-04-14 DIAGNOSIS — Z00.00 ENCOUNTER FOR MEDICARE ANNUAL WELLNESS EXAM: Primary | ICD-10-CM

## 2021-04-14 DIAGNOSIS — Z79.899 HIGH RISK MEDICATION USE: ICD-10-CM

## 2021-04-14 DIAGNOSIS — N18.32 STAGE 3B CHRONIC KIDNEY DISEASE (H): ICD-10-CM

## 2021-04-14 DIAGNOSIS — M79.671 RIGHT FOOT PAIN: ICD-10-CM

## 2021-04-14 PROBLEM — N18.5 CKD (CHRONIC KIDNEY DISEASE) STAGE 5, GFR LESS THAN 15 ML/MIN (H): Status: ACTIVE | Noted: 2021-04-14

## 2021-04-14 PROBLEM — N18.5 CKD (CHRONIC KIDNEY DISEASE) STAGE 5, GFR LESS THAN 15 ML/MIN (H): Status: RESOLVED | Noted: 2021-04-14 | Resolved: 2021-04-14

## 2021-04-14 PROBLEM — N18.4 CKD (CHRONIC KIDNEY DISEASE) STAGE 4, GFR 15-29 ML/MIN (H): Status: ACTIVE | Noted: 2021-04-14

## 2021-04-14 PROBLEM — N18.4 CKD (CHRONIC KIDNEY DISEASE) STAGE 4, GFR 15-29 ML/MIN (H): Status: RESOLVED | Noted: 2021-04-14 | Resolved: 2021-04-14

## 2021-04-14 PROBLEM — N18.30 CHRONIC KIDNEY DISEASE, STAGE 3 (H): Status: ACTIVE | Noted: 2021-04-14

## 2021-04-14 PROCEDURE — G0009 ADMIN PNEUMOCOCCAL VACCINE: HCPCS | Performed by: NURSE PRACTITIONER

## 2021-04-14 PROCEDURE — 99214 OFFICE O/P EST MOD 30 MIN: CPT | Mod: 25 | Performed by: NURSE PRACTITIONER

## 2021-04-14 PROCEDURE — G0402 INITIAL PREVENTIVE EXAM: HCPCS | Performed by: NURSE PRACTITIONER

## 2021-04-14 PROCEDURE — 90732 PPSV23 VACC 2 YRS+ SUBQ/IM: CPT | Performed by: NURSE PRACTITIONER

## 2021-04-14 ASSESSMENT — ENCOUNTER SYMPTOMS
RHINORRHEA: 0
DIARRHEA: 0
DIAPHORESIS: 0
DIZZINESS: 0
HEADACHES: 0
MYALGIAS: 0
FEVER: 0
SHORTNESS OF BREATH: 0
LIGHT-HEADEDNESS: 0
NUMBNESS: 0
BRUISES/BLEEDS EASILY: 0
NAUSEA: 0
COUGH: 0
PALPITATIONS: 0
ROS GI COMMENTS: OCCASIONAL REFLUX
VOMITING: 0
CONFUSION: 0
FREQUENCY: 0
EYE DISCHARGE: 0
SORE THROAT: 0
WHEEZING: 0
FATIGUE: 0
ARTHRALGIAS: 0
CHEST TIGHTNESS: 0
DYSURIA: 0
SINUS PRESSURE: 0

## 2021-04-14 ASSESSMENT — ACTIVITIES OF DAILY LIVING (ADL): CURRENT_FUNCTION: NO ASSISTANCE NEEDED

## 2021-04-14 ASSESSMENT — MIFFLIN-ST. JEOR: SCORE: 1849.26

## 2021-04-14 ASSESSMENT — PAIN SCALES - GENERAL: PAINLEVEL: NO PAIN (0)

## 2021-04-14 NOTE — PROGRESS NOTES
"SUBJECTIVE:   Leandro Hodge is a 65 year old male who presents for Preventive Visit.    Chief Complaint   Patient presents with     Physical     pt is not fasting     Patient has been advised of split billing requirements and indicates understanding: Yes   Are you in the first 12 months of your Medicare coverage?  Yes,  Visual Acuity:  Right Eye: 20/25   Left Eye: 20/25  Both Eyes: 20/25    Healthy Habits:    In general, how would you rate your overall health?  Good    Frequency of exercise:  None    Do you usually eat at least 4 servings of fruit and vegetables a day, include whole grains    & fiber and avoid regularly eating high fat or \"junk\" foods?  No    Taking medications regularly:  No    Barriers to taking medications:  None    Medication side effects:  None    Ability to successfully perform activities of daily living:  No assistance needed    Home Safety:  No safety concerns identified    Hearing Impairment:  No hearing concerns    In the past 6 months, have you been bothered by leaking of urine?  No    In general, how would you rate your overall mental or emotional health?  Good      PHQ-2 Total Score:    Additional concerns today:  No    Do you feel safe in your environment? Yes    Have you ever done Advance Care Planning? (For example, a Health Directive, POLST, or a discussion with a medical provider or your loved ones about your wishes): No, advance care planning information given to patient to review.  Patient plans to discuss their wishes with loved ones or provider.         Fall risk  Fallen 2 or more times in the past year?: No  Any fall with injury in the past year?: No    Cognitive Screening   1) Repeat 3 items (Leader, Season, Table)    2) Clock draw: NORMAL  3) 3 item recall: Recalls 3 objects  Results: 3 items recalled: COGNITIVE IMPAIRMENT LESS LIKELY    Mini-CogTM Copyright WILTON Vásquez. Licensed by the author for use in [facility]; reprinted with permission (fouzia@.[facility]). All " rights reserved.      Do you have sleep apnea, excessive snoring or daytime drowsiness?: no    Reviewed and updated as needed this visit by clinical staff  Tobacco  Allergies  Meds  Problems  Med Hx  Surg Hx  Fam Hx  Soc Hx          Reviewed and updated as needed this visit by Provider     Problems            Social History     Tobacco Use     Smoking status: Former Smoker     Packs/day: 0.00     Smokeless tobacco: Former User   Substance Use Topics     Alcohol use: Yes     Comment: very little     If you drink alcohol do you typically have >3 drinks per day or >7 drinks per week? No    Alcohol Use 4/14/2021   Prescreen: >3 drinks/day or >7 drinks/week? No         Current providers sharing in care for this patient include:   Patient Care Team:  [facility] as PCP - General  Karlene Holder APRN CNP as Assigned PCP  Crescencio Kapadia DPM as Assigned Musculoskeletal Provider    The following health maintenance items are reviewed in Epic and correct as of today:  Health Maintenance Due   Topic Date Due     ZOSTER IMMUNIZATION (1 of 2) Never done     COLORECTAL CANCER SCREENING  04/01/2020     FALL RISK ASSESSMENT  Never done     AORTIC ANEURYSM SCREENING (SYSTEM ASSIGNED)  Never done     DTAP/TDAP/TD IMMUNIZATION (2 - Td) 03/01/2021     Current Outpatient Medications   Medication Sig Dispense Refill     Ascorbic Acid (VITAMIN C PO)        Cholecalciferol (VITAMIN D3 PO) Take by mouth daily       omeprazole (PRILOSEC) 20 MG DR capsule Take 1 capsule (20 mg) by mouth every other day 45 capsule 3     No Known Allergies  Pneumonia Vaccine:Adults age 65+ who have not received previous Pneumovax (PPSV23) or PCV13 as an adult: Should first be given PCV13 AND then should be given PPSV23 6-12 months after PCV13  Any new diagnosis of family breast, ovarian, or bowel cancer? No    FSH-7: No flowsheet data found.      Pertinent mammograms are reviewed under the imaging tab.    Here today for  Medicare physical.  Is not fasting today for labs.  Plans to retire before the end of the year.  Is going to be moving to [facility] after he retires.  Has a -plans to work with them to get healthcare directive, POLST and will completed.    Right foot with a lot pain. At times will feel swollen or like it is sleeping. Played FB in HS. In the fall would have a hard time walking. Is currently taking advil 400 mg twice per day. That does help to decrease the pain. Has not tried any tylenol for the pain.  Has seen podiatry in the past.    Gets up 1-2 times per night to go to the BR, does note decreased urine stream, does feel like empties bladder all the way.  No blood in urine.    Takes omeprazole every other day for reflux. Seems to work well. Will still get some reflux or buring. Has been taking over the counter medication for reflux as well, unsure what the name of it is.     Last PSA: No family history of prostate cancer.   Last Colonoscopy: 2010-normal. Repeat in 10 years. No family history of colon cancer.   Last eye exam: Routinely  Last dental exam: Routinely  Last tetanus vaccine: Plans to get at pharmacy  Last influenza vaccine: Did not have  Last shingles vaccine: Plans to get at pharmacy  Last pneumonia vaccine: Administered today in clinic  Last COVID vaccine: Done 3/6/20 J&J  Hep C screen: done 2018  HIV screen: Declines   AAA screen (age 65-78 with smoking hx): N/A  IVD (HTN, Hyperlipid, Smoking):  Lung CA screening (55-80, 30 pk smoking hx): Occasional nicotine use in the past, none for 30+ years    Review of Systems   Constitutional: Negative for diaphoresis, fatigue and fever.   HENT: Negative for congestion, ear pain, postnasal drip, rhinorrhea, sinus pressure and sore throat.    Eyes: Negative for discharge.   Respiratory: Negative for cough, chest tightness, shortness of breath and wheezing.    Cardiovascular: Negative for chest pain and palpitations.   Gastrointestinal:  "Negative for diarrhea, nausea and vomiting.        Occasional reflux   Genitourinary: Negative for dysuria, frequency and urgency.   Musculoskeletal: Negative for arthralgias and myalgias.        Right foot pain   Skin: Negative for rash.   Neurological: Negative for dizziness, light-headedness, numbness and headaches.   Hematological: Does not bruise/bleed easily.   Psychiatric/Behavioral: Negative for confusion.       OBJECTIVE:   /80 (BP Location: Right arm, Patient Position: Sitting, Cuff Size: Adult Large)   Pulse 80   Temp 97.4  F (36.3  C) (Tympanic)   Resp 20   Ht 1.765 m (5' 9.5\")   Wt 106.6 kg (235 lb)   BMI 34.21 kg/m   Estimated body mass index is 34.21 kg/m  as calculated from the following:    Height as of this encounter: 1.765 m (5' 9.5\").    Weight as of this encounter: 106.6 kg (235 lb).  Physical Exam  Constitutional:       Appearance: He is well-developed.   HENT:      Right Ear: Tympanic membrane and external ear normal. No middle ear effusion. Tympanic membrane is not erythematous.      Left Ear: Tympanic membrane and external ear normal.  No middle ear effusion. Tympanic membrane is not erythematous.      Mouth/Throat:      Pharynx: Uvula midline.   Eyes:      Pupils: Pupils are equal, round, and reactive to light.   Neck:      Thyroid: No thyromegaly.      Vascular: No carotid bruit.   Cardiovascular:      Rate and Rhythm: Normal rate and regular rhythm.      Pulses:           Femoral pulses are 2+ on the right side and 2+ on the left side.     Heart sounds: Normal heart sounds.   Pulmonary:      Effort: Pulmonary effort is normal.      Breath sounds: Normal breath sounds.   Abdominal:      General: Bowel sounds are normal. There is no distension.      Palpations: Abdomen is soft.      Tenderness: There is no abdominal tenderness.   Musculoskeletal: Normal range of motion.   Skin:     General: Skin is warm and dry.   Neurological:      Mental Status: He is alert.         ASSESSMENT " / PLAN:   Encounter for Medicare annual wellness exam  Screening guidelines reviewed.   - Lipid panel reflex to direct LDL Fasting; Future  - TSH with free T4 reflex; Future  Encouraged to have tetanus and shingles vaccine at pharmacy.  Encouraged to send copy of healthcare directive and POLST when completed.  Will enter into chart at that time.    Gastroesophageal reflux disease without esophagitis  Encouraged to try and decrease use.  Make sure that over-the-counter product that is taking is not the same medication  - omeprazole (PRILOSEC) 20 MG DR capsule; Take 1 capsule (20 mg) by mouth every other day    Stage 3b chronic kidney disease  We will recheck lab here today.  Encouraged to avoid over-the-counter NSAIDs.  Warned regarding use of Voltaren gel however, this is a safer option.  If renal function remains elevated consider referral to nephrology    Status post gastric bypass for obesity  We will check annual labs here today  - Comprehensive metabolic panel; Future  - Folate; Future  - Parathyroid Hormone Intact; Future  - Vitamin B12; Future    Screen for colon cancer  - GASTROENTEROLOGY ADULT REF PROCEDURE ONLY; Future    High risk medication use  - Vitamin D Deficiency; Future  - CBC with platelets differential; Future    Chronic kidney disease, unspecified CKD stage   We will recheck lab here today.  Encouraged to avoid over-the-counter NSAIDs.  Warned regarding use of Voltaren gel however, this is a safer option.  If renal function remains elevated consider referral to nephrology  - Iron; Future    Need for vaccination  Administered today in clinic  - Pneumococcal vaccine 23 valent PPSV23  (Pneumovax) [[id number]]    Right foot pain  Previous podiatry notes reviewed.  Declines referral    Patient has been advised of split billing requirements and indicates understanding: Yes  COUNSELING:  Reviewed preventive health counseling, as reflected in patient instructions       Regular exercise       Healthy  "diet/nutrition       Bladder control       Immunizations    Vaccinated for: Pneumococcal plans to get shingles and tetanus vaccine at pharmacy.           Hepatitis C screening       Colon cancer screening       Prostate cancer screening    Estimated body mass index is 34.21 kg/m  as calculated from the following:    Height as of this encounter: 1.765 m (5' 9.5\").    Weight as of this encounter: 106.6 kg (235 lb).    Weight management plan: Discussed healthy diet and exercise guidelines    He reports that he has quit smoking. He smoked 0.00 packs per day. He has quit using smokeless tobacco.      Appropriate preventive services were discussed with this patient, including applicable screening as appropriate for cardiovascular disease, diabetes, osteopenia/osteoporosis, and glaucoma.  As appropriate for age/gender, discussed screening for colorectal cancer, prostate cancer, breast cancer, and cervical cancer. Checklist reviewing preventive services available has been given to the patient.    Reviewed patients plan of care and provided an AVS. The Basic Care Plan (routine screening as documented in Health Maintenance) for Leandro meets the Care Plan requirement. This Care Plan has been established and reviewed with the Patient.    Counseling Resources:  ATP IV Guidelines  Pooled Cohorts Equation Calculator  Breast Cancer Risk Calculator  Breast Cancer: Medication to Reduce Risk  FRAX Risk Assessment  ICSI Preventive Guidelines  Dietary Guidelines for Americans, 2010  USDA's MyPlate  ASA Prophylaxis  Lung CA Screening    YOBANY Argueta [facility]    Identified Health Risks:  "

## 2021-04-14 NOTE — PATIENT INSTRUCTIONS
May use over the counter Voltaran Gel to painful joints.     Please stop using over the counter ibuprofen, advil, aleve or naproxen. Taking the Tylenol is safe to use.       Go to pharmacy for tetanus and shingles vaccines.    Patient Education   Personalized Prevention Plan  You are due for the preventive services outlined below.  Your care team is available to assist you in scheduling these services.  If you have already completed any of these items, please share that information with your care team to update in your medical record.  Health Maintenance Due   Topic Date Due     Discuss Advance Care Planning  Never done     Zoster (Shingles) Vaccine (1 of 2) Never done     Colorectal Cancer Screening  04/01/2020     FALL RISK ASSESSMENT  Never done     Pneumococcal Vaccine (1 of 1 - PPSV23) Never done     AORTIC ANEURYSM SCREENING (SYSTEM ASSIGNED)  Never done     Annual Wellness Visit  10/31/2020     PHQ-2  01/01/2021     Diptheria Tetanus Pertussis (DTAP/TDAP/TD) Vaccine (2 - Td) 03/01/2021

## 2021-05-03 DIAGNOSIS — N18.9 CHRONIC KIDNEY DISEASE, UNSPECIFIED CKD STAGE: Primary | ICD-10-CM

## 2021-05-03 DIAGNOSIS — Z00.00 ENCOUNTER FOR MEDICARE ANNUAL WELLNESS EXAM: ICD-10-CM

## 2021-05-03 DIAGNOSIS — Z79.899 HIGH RISK MEDICATION USE: ICD-10-CM

## 2021-05-03 DIAGNOSIS — N18.9 CHRONIC KIDNEY DISEASE, UNSPECIFIED CKD STAGE: ICD-10-CM

## 2021-05-03 DIAGNOSIS — Z98.84 STATUS POST GASTRIC BYPASS FOR OBESITY: ICD-10-CM

## 2021-05-03 LAB
ALBUMIN SERPL-MCNC: 3.5 G/DL (ref 3.4–5)
ALP SERPL-CCNC: 70 U/L (ref 40–150)
ALT SERPL W P-5'-P-CCNC: 38 U/L (ref 0–70)
ANION GAP SERPL CALCULATED.3IONS-SCNC: 3 MMOL/L (ref 3–14)
AST SERPL W P-5'-P-CCNC: 25 U/L (ref 0–45)
BASOPHILS # BLD AUTO: 0.1 10E9/L (ref 0–0.2)
BASOPHILS NFR BLD AUTO: 0.6 %
BILIRUB SERPL-MCNC: 0.6 MG/DL (ref 0.2–1.3)
BUN SERPL-MCNC: 20 MG/DL (ref 7–30)
CALCIUM SERPL-MCNC: 8.4 MG/DL (ref 8.5–10.1)
CHLORIDE SERPL-SCNC: 112 MMOL/L (ref 94–109)
CHOLEST SERPL-MCNC: 171 MG/DL
CO2 SERPL-SCNC: 25 MMOL/L (ref 20–32)
CREAT SERPL-MCNC: 1.77 MG/DL (ref 0.66–1.25)
DEPRECATED CALCIDIOL+CALCIFEROL SERPL-MC: 32 UG/L (ref 20–75)
DIFFERENTIAL METHOD BLD: NORMAL
EOSINOPHIL # BLD AUTO: 0.2 10E9/L (ref 0–0.7)
EOSINOPHIL NFR BLD AUTO: 2.1 %
ERYTHROCYTE [DISTWIDTH] IN BLOOD BY AUTOMATED COUNT: 14.3 % (ref 10–15)
FOLATE SERPL-MCNC: 33.1 NG/ML
GFR SERPL CREATININE-BSD FRML MDRD: 39 ML/MIN/{1.73_M2}
GLUCOSE SERPL-MCNC: 85 MG/DL (ref 70–99)
HCT VFR BLD AUTO: 41.8 % (ref 40–53)
HDLC SERPL-MCNC: 39 MG/DL
HGB BLD-MCNC: 13.5 G/DL (ref 13.3–17.7)
IRON SERPL-MCNC: 82 UG/DL (ref 35–180)
LDLC SERPL CALC-MCNC: 103 MG/DL
LYMPHOCYTES # BLD AUTO: 2.2 10E9/L (ref 0.8–5.3)
LYMPHOCYTES NFR BLD AUTO: 26.5 %
MCH RBC QN AUTO: 28.1 PG (ref 26.5–33)
MCHC RBC AUTO-ENTMCNC: 32.3 G/DL (ref 31.5–36.5)
MCV RBC AUTO: 87 FL (ref 78–100)
MONOCYTES # BLD AUTO: 0.7 10E9/L (ref 0–1.3)
MONOCYTES NFR BLD AUTO: 8.9 %
NEUTROPHILS # BLD AUTO: 5.1 10E9/L (ref 1.6–8.3)
NEUTROPHILS NFR BLD AUTO: 61.9 %
NONHDLC SERPL-MCNC: 132 MG/DL
PLATELET # BLD AUTO: 264 10E9/L (ref 150–450)
POTASSIUM SERPL-SCNC: 4.9 MMOL/L (ref 3.4–5.3)
PROT SERPL-MCNC: 6.8 G/DL (ref 6.8–8.8)
PTH-INTACT SERPL-MCNC: 113 PG/ML (ref 18–80)
RBC # BLD AUTO: 4.81 10E12/L (ref 4.4–5.9)
SODIUM SERPL-SCNC: 140 MMOL/L (ref 133–144)
TRIGL SERPL-MCNC: 143 MG/DL
TSH SERPL DL<=0.005 MIU/L-ACNC: 2.62 MU/L (ref 0.4–4)
VIT B12 SERPL-MCNC: 243 PG/ML (ref 193–986)
WBC # BLD AUTO: 8.3 10E9/L (ref 4–11)

## 2021-05-03 PROCEDURE — 82607 VITAMIN B-12: CPT | Performed by: NURSE PRACTITIONER

## 2021-05-03 PROCEDURE — 82746 ASSAY OF FOLIC ACID SERUM: CPT | Performed by: NURSE PRACTITIONER

## 2021-05-03 PROCEDURE — 85025 COMPLETE CBC W/AUTO DIFF WBC: CPT | Performed by: NURSE PRACTITIONER

## 2021-05-03 PROCEDURE — 80061 LIPID PANEL: CPT | Performed by: NURSE PRACTITIONER

## 2021-05-03 PROCEDURE — 83540 ASSAY OF IRON: CPT | Performed by: NURSE PRACTITIONER

## 2021-05-03 PROCEDURE — 80053 COMPREHEN METABOLIC PANEL: CPT | Performed by: NURSE PRACTITIONER

## 2021-05-03 PROCEDURE — 36415 COLL VENOUS BLD VENIPUNCTURE: CPT | Performed by: NURSE PRACTITIONER

## 2021-05-03 PROCEDURE — 84443 ASSAY THYROID STIM HORMONE: CPT | Performed by: NURSE PRACTITIONER

## 2021-05-03 PROCEDURE — 83970 ASSAY OF PARATHORMONE: CPT | Performed by: NURSE PRACTITIONER

## 2021-05-03 PROCEDURE — 82306 VITAMIN D 25 HYDROXY: CPT | Performed by: NURSE PRACTITIONER

## 2021-05-03 NOTE — LETTER
May 4, 2021      Leandro HORN Naveen  [address] [facility] [address]      Leandro,      You have Vitamin D insufficiency . You need to start taking Vitamin D3 2000 units/50 mcg orally daily and plan to recheck your Vitamin D level again in 3 months.     Your thyroid function is normal.    Your LDL, bad cholesterol is a bit high.  Your HDL, healthy cholesterol is a bit low.  The best way to improve both of these numbers is to trying to be active for at least 30 to 40 minutes 3-4 times per week.     Your kidney function is more elevated than it was at last check 1 year ago.  Please avoid over-the-counter ibuprofen, Advil, Aleve and naproxen.  I would recommend being evaluated by a nephrologist, kidney specialist.  I have placed this referral order.  They will be contacting you to set up appointment date and time that works best for you.     Your blood counts are in normal range.    Resulted Orders   Vitamin B12   Result Value Ref Range    Vitamin B12 243 193 - 986 pg/mL   Parathyroid Hormone Intact   Result Value Ref Range    Parathyroid Hormone Intact 113 (H) 18 - 80 pg/mL   Iron   Result Value Ref Range    Iron 82 35 - 180 ug/dL   CBC with platelets differential   Result Value Ref Range    WBC 8.3 4.0 - 11.0 10e9/L    RBC Count 4.81 4.4 - 5.9 10e12/L    Hemoglobin 13.5 13.3 - 17.7 g/dL    Hematocrit 41.8 40.0 - 53.0 %    MCV 87 78 - 100 fl    MCH 28.1 26.5 - 33.0 pg    MCHC 32.3 31.5 - 36.5 g/dL    RDW 14.3 10.0 - 15.0 %    Platelet Count 264 150 - 450 10e9/L    % Neutrophils 61.9 %    % Lymphocytes 26.5 %    % Monocytes 8.9 %    % Eosinophils 2.1 %    % Basophils 0.6 %    Absolute Neutrophil 5.1 1.6 - 8.3 10e9/L    Absolute Lymphocytes 2.2 0.8 - 5.3 10e9/L    Absolute Monocytes 0.7 0.0 - 1.3 10e9/L    Absolute Eosinophils 0.2 0.0 - 0.7 10e9/L    Absolute Basophils 0.1 0.0 - 0.2 10e9/L    Diff Method Automated Method    Lipid panel reflex to direct LDL Fasting   Result Value Ref Range    Cholesterol 171 <200  mg/dL    Triglycerides 143 <150 mg/dL      Comment:      Fasting specimen    HDL Cholesterol 39 (L) >39 mg/dL    LDL Cholesterol Calculated 103 (H) <100 mg/dL      Comment:      Above desirable:  100-129 mg/dl  Borderline High:  130-159 mg/dL  High:             160-189 mg/dL  Very high:       >189 mg/dl      Non HDL Cholesterol 132 (H) <130 mg/dL      Comment:      Above Desirable:  130-159 mg/dl  Borderline high:  160-189 mg/dl  High:             190-219 mg/dl  Very high:       >219 mg/dl     Comprehensive metabolic panel   Result Value Ref Range    Sodium 140 133 - 144 mmol/L    Potassium 4.9 3.4 - 5.3 mmol/L    Chloride 112 (H) 94 - 109 mmol/L    Carbon Dioxide 25 20 - 32 mmol/L    Anion Gap 3 3 - 14 mmol/L    Glucose 85 70 - 99 mg/dL      Comment:      Fasting specimen    Urea Nitrogen 20 7 - 30 mg/dL    Creatinine 1.77 (H) 0.66 - 1.25 mg/dL    GFR Estimate 39 (L) >60 mL/min/[1.73_m2]      Comment:      Non  GFR Calc  Starting 12/18/2018, serum creatinine based estimated GFR (eGFR) will be   calculated using the Chronic Kidney Disease Epidemiology Collaboration   (CKD-EPI) equation.      GFR Estimate If Black 45 (L) >60 mL/min/[1.73_m2]      Comment:       GFR Calc  Starting 12/18/2018, serum creatinine based estimated GFR (eGFR) will be   calculated using the Chronic Kidney Disease Epidemiology Collaboration   (CKD-EPI) equation.      Calcium 8.4 (L) 8.5 - 10.1 mg/dL    Bilirubin Total 0.6 0.2 - 1.3 mg/dL    Albumin 3.5 3.4 - 5.0 g/dL    Protein Total 6.8 6.8 - 8.8 g/dL    Alkaline Phosphatase 70 40 - 150 U/L    ALT 38 0 - 70 U/L    AST 25 0 - 45 U/L       If you have any questions or concerns, please call the clinic at the number listed above.       Sincerely,      YOBANY Celis CNP/ag

## 2021-05-19 DIAGNOSIS — Z11.59 ENCOUNTER FOR SCREENING FOR OTHER VIRAL DISEASES: ICD-10-CM

## 2021-06-01 DIAGNOSIS — Z11.59 ENCOUNTER FOR SCREENING FOR OTHER VIRAL DISEASES: ICD-10-CM

## 2021-06-01 LAB
SARS-COV-2 RNA RESP QL NAA+PROBE: NORMAL
SPECIMEN SOURCE: NORMAL

## 2021-06-01 PROCEDURE — U0003 INFECTIOUS AGENT DETECTION BY NUCLEIC ACID (DNA OR RNA); SEVERE ACUTE RESPIRATORY SYNDROME CORONAVIRUS 2 (SARS-COV-2) (CORONAVIRUS DISEASE [COVID-19]), AMPLIFIED PROBE TECHNIQUE, MAKING USE OF HIGH THROUGHPUT TECHNOLOGIES AS DESCRIBED BY CMS-2020-01-R: HCPCS | Performed by: SURGERY

## 2021-06-01 PROCEDURE — U0005 INFEC AGEN DETEC AMPLI PROBE: HCPCS | Performed by: SURGERY

## 2021-06-02 LAB
LABORATORY COMMENT REPORT: NORMAL
SARS-COV-2 RNA RESP QL NAA+PROBE: NEGATIVE
SPECIMEN SOURCE: NORMAL

## 2021-06-03 ENCOUNTER — ANESTHESIA EVENT (OUTPATIENT)
Dept: GASTROENTEROLOGY | Facility: CLINIC | Age: 66
End: 2021-06-03
Payer: MEDICARE

## 2021-06-03 NOTE — ANESTHESIA PREPROCEDURE EVALUATION
Anesthesia Pre-Procedure Evaluation    Patient: Leandro Hodge   MRN: [id number] : 1955        Preoperative Diagnosis: Screen for colon cancer [Z12.11]   Procedure : Procedure(s):  COLONOSCOPY     No past medical history on file.   Past Surgical History:   Procedure Laterality Date     APPENDECTOMY       gastic surgery      for weight loss in       No Known Allergies   Social History     Tobacco Use     Smoking status: Former Smoker     Packs/day: 0.00     Smokeless tobacco: Former User   Substance Use Topics     Alcohol use: Yes     Comment: very little      Wt Readings from Last 1 Encounters:   21 106.6 kg (235 lb)        Anesthesia Evaluation   Pt has had prior anesthetic. Type: General.    No history of anesthetic complications       ROS/MED HX  ENT/Pulmonary:  - neg pulmonary ROS     Neurologic:  - neg neurologic ROS     Cardiovascular:  - neg cardiovascular ROS     METS/Exercise Tolerance: >4 METS    Hematologic:       Musculoskeletal:  - neg musculoskeletal ROS     GI/Hepatic:     (+) GERD, Asymptomatic on medication,     Renal/Genitourinary:     (+) renal disease, type: CRI,     Endo:     (+) Obesity,     Psychiatric/Substance Use:  - neg psychiatric ROS     Infectious Disease:  - neg infectious disease ROS     Malignancy:  - neg malignancy ROS     Other:  - neg other ROS          Physical Exam    Airway        Mallampati: II   TM distance: > 3 FB   Neck ROM: full   Mouth opening: > 3 cm    Respiratory Devices and Support         Dental  no notable dental history         Cardiovascular   cardiovascular exam normal          Pulmonary   pulmonary exam normal                OUTSIDE LABS:  CBC:   Lab Results   Component Value Date    WBC 8.3 2021    WBC 7.5 10/25/2018    HGB 13.5 2021    HGB 14.5 10/25/2018    HCT 41.8 2021    HCT 43.9 10/25/2018     2021     10/25/2018     BMP:   Lab Results   Component Value Date     2021      10/31/2019    POTASSIUM 4.9 05/03/2021    POTASSIUM 4.9 10/31/2019    CHLORIDE 112 (H) 05/03/2021    CHLORIDE 109 10/31/2019    CO2 25 05/03/2021    CO2 26 10/31/2019    BUN 20 05/03/2021    BUN 23 10/31/2019    CR 1.77 (H) 05/03/2021    CR 1.63 (H) 10/31/2019    GLC 85 05/03/2021    GLC 88 10/31/2019     COAGS: No results found for: PTT, INR, FIBR  POC: No results found for: BGM, HCG, HCGS  HEPATIC:   Lab Results   Component Value Date    ALBUMIN 3.5 05/03/2021    PROTTOTAL 6.8 05/03/2021    ALT 38 05/03/2021    AST 25 05/03/2021    ALKPHOS 70 05/03/2021    BILITOTAL 0.6 05/03/2021     OTHER:   Lab Results   Component Value Date    REZA 8.4 (L) 05/03/2021    TSH 2.62 05/03/2021       Anesthesia Plan    ASA Status:  2   NPO Status:  NPO Appropriate    Anesthesia Type: General.   Induction: Intravenous, Propofol.   Maintenance: TIVA.        Consents    Anesthesia Plan(s) and associated risks, benefits, and realistic alternatives discussed. Questions answered and patient/representative(s) expressed understanding.     - Discussed with:  Patient         Postoperative Care    Pain management: IV analgesics, Oral pain medications.   PONV prophylaxis: Ondansetron (or other 5HT-3), Dexamethasone or Solumedrol     Comments:                YOBANY Fofana CRNA

## 2021-06-04 ENCOUNTER — HOSPITAL ENCOUNTER (OUTPATIENT)
Facility: CLINIC | Age: 66
Discharge: HOME | End: 2021-06-04
Attending: SURGERY | Admitting: SURGERY
Payer: MEDICARE

## 2021-06-04 ENCOUNTER — ANESTHESIA (OUTPATIENT)
Dept: GASTROENTEROLOGY | Facility: CLINIC | Age: 66
End: 2021-06-04
Payer: MEDICARE

## 2021-06-04 VITALS
OXYGEN SATURATION: 93 % | HEART RATE: 71 BPM | RESPIRATION RATE: 14 BRPM | WEIGHT: 235 LBS | HEIGHT: 70 IN | TEMPERATURE: 97.6 F | SYSTOLIC BLOOD PRESSURE: 118 MMHG | DIASTOLIC BLOOD PRESSURE: 69 MMHG | BODY MASS INDEX: 33.64 KG/M2

## 2021-06-04 LAB — COLONOSCOPY: NORMAL

## 2021-06-04 PROCEDURE — 370N000017 HC ANESTHESIA TECHNICAL FEE, PER MIN: Performed by: SURGERY

## 2021-06-04 PROCEDURE — 45380 COLONOSCOPY AND BIOPSY: CPT | Mod: PT | Performed by: SURGERY

## 2021-06-04 PROCEDURE — 258N000003 HC RX IP 258 OP 636: Performed by: SURGERY

## 2021-06-04 PROCEDURE — 250N000009 HC RX 250: Performed by: NURSE ANESTHETIST, CERTIFIED REGISTERED

## 2021-06-04 PROCEDURE — 250N000009 HC RX 250: Performed by: SURGERY

## 2021-06-04 PROCEDURE — 88305 TISSUE EXAM BY PATHOLOGIST: CPT | Mod: 26 | Performed by: PATHOLOGY

## 2021-06-04 PROCEDURE — 250N000011 HC RX IP 250 OP 636: Performed by: NURSE ANESTHETIST, CERTIFIED REGISTERED

## 2021-06-04 PROCEDURE — 88305 TISSUE EXAM BY PATHOLOGIST: CPT | Mod: TC | Performed by: SURGERY

## 2021-06-04 RX ORDER — PROPOFOL 10 MG/ML
INJECTION, EMULSION INTRAVENOUS CONTINUOUS PRN
Status: DISCONTINUED | OUTPATIENT
Start: 2021-06-04 | End: 2021-06-04

## 2021-06-04 RX ORDER — PROPOFOL 10 MG/ML
INJECTION, EMULSION INTRAVENOUS PRN
Status: DISCONTINUED | OUTPATIENT
Start: 2021-06-04 | End: 2021-06-04

## 2021-06-04 RX ORDER — ONDANSETRON 2 MG/ML
4 INJECTION INTRAMUSCULAR; INTRAVENOUS
Status: DISCONTINUED | OUTPATIENT
Start: 2021-06-04 | End: 2021-06-04 | Stop reason: HOSPADM

## 2021-06-04 RX ORDER — LIDOCAINE 40 MG/G
CREAM TOPICAL
Status: DISCONTINUED | OUTPATIENT
Start: 2021-06-04 | End: 2021-06-04 | Stop reason: HOSPADM

## 2021-06-04 RX ORDER — SODIUM CHLORIDE, SODIUM LACTATE, POTASSIUM CHLORIDE, CALCIUM CHLORIDE 600; 310; 30; 20 MG/100ML; MG/100ML; MG/100ML; MG/100ML
INJECTION, SOLUTION INTRAVENOUS CONTINUOUS
Status: DISCONTINUED | OUTPATIENT
Start: 2021-06-04 | End: 2021-06-04 | Stop reason: HOSPADM

## 2021-06-04 RX ORDER — LIDOCAINE HYDROCHLORIDE 10 MG/ML
INJECTION, SOLUTION EPIDURAL; INFILTRATION; INTRACAUDAL; PERINEURAL PRN
Status: DISCONTINUED | OUTPATIENT
Start: 2021-06-04 | End: 2021-06-04

## 2021-06-04 RX ADMIN — LIDOCAINE HYDROCHLORIDE 5 ML: 10 INJECTION, SOLUTION EPIDURAL; INFILTRATION; INTRACAUDAL; PERINEURAL at 10:29

## 2021-06-04 RX ADMIN — PROPOFOL 200 MCG/KG/MIN: 10 INJECTION, EMULSION INTRAVENOUS at 10:29

## 2021-06-04 RX ADMIN — SODIUM CHLORIDE, POTASSIUM CHLORIDE, SODIUM LACTATE AND CALCIUM CHLORIDE: 600; 310; 30; 20 INJECTION, SOLUTION INTRAVENOUS at 09:38

## 2021-06-04 RX ADMIN — PROPOFOL 70 MG: 10 INJECTION, EMULSION INTRAVENOUS at 10:29

## 2021-06-04 RX ADMIN — LIDOCAINE HYDROCHLORIDE 1 ML: 10 INJECTION, SOLUTION EPIDURAL; INFILTRATION; INTRACAUDAL; PERINEURAL at 09:38

## 2021-06-04 ASSESSMENT — MIFFLIN-ST. JEOR: SCORE: 1849.26

## 2021-06-04 NOTE — ANESTHESIA POSTPROCEDURE EVALUATION
Patient: Leandro Hodge    Procedure(s):  COLONOSCOPY    Diagnosis:Screen for colon cancer [Z12.11]  Diagnosis Additional Information: No value filed.    Anesthesia Type:  General    Note:  Disposition: Outpatient   Postop Pain Control: Uneventful            Sign Out: Well controlled pain   PONV: No   Neuro/Psych: Uneventful            Sign Out: Acceptable/Baseline neuro status   Airway/Respiratory: Uneventful            Sign Out: Acceptable/Baseline resp. status; O2 supplementation   CV/Hemodynamics: Uneventful            Sign Out: Acceptable CV status; No obvious hypovolemia; No obvious fluid overload   Other NRE: NONE   DID A NON-ROUTINE EVENT OCCUR? No           Last vitals:  Vitals:    06/04/21 0906   BP: (!) 144/91   Pulse: 80   Temp: 36.4  C (97.6  F)   SpO2: 100%       Last vitals prior to Anesthesia Care Transfer:  CRNA VITALS  6/4/2021 1001 - 6/4/2021 1031      6/4/2021             Pulse:  77    Ht Rate:  76    SpO2:  100 %    Resp Rate (observed):  17          Electronically Signed By: YOBANY Downs CRNA  June 4, 2021  10:31 AM

## 2021-06-04 NOTE — ANESTHESIA CARE TRANSFER NOTE
Patient: Leandro Hodge    Procedure(s):  COLONOSCOPY    Diagnosis: Screen for colon cancer [Z12.11]  Diagnosis Additional Information: No value filed.    Anesthesia Type:   General     Note:    Oropharynx: spontaneously breathing  Level of Consciousness: drowsy  Oxygen Supplementation: nasal cannula  Level of Supplemental Oxygen (L/min / FiO2): 2  Independent Airway: airway patency satisfactory and stable  Dentition: dentition unchanged  Vital Signs Stable: post-procedure vital signs reviewed and stable  Report to RN Given: handoff report given  Patient transferred to: Phase II          Vitals: (Last set prior to Anesthesia Care Transfer)  CRNA VITALS  6/4/2021 1001 - 6/4/2021 1031      6/4/2021             Pulse:  77    Ht Rate:  76    SpO2:  100 %    Resp Rate (observed):  17        Electronically Signed By: YOBANY Downs CRNA  June 4, 2021  10:31 AM

## 2021-06-04 NOTE — H&P
"65 year old year old male here for colonoscopy for personal history of polyps.  Last colonoscopy was , states it was incomplete, Barium enema needed to be performed.  Patient denies any changes in stool caliber or blood in stool. Patient denies family history of colon cancer.  States he has an occasional flare of a fissure.    Patient Active Problem List   Diagnosis     Chronic kidney disease, stage 3     Gastroesophageal reflux disease without esophagitis     Status post gastric bypass for obesity       History reviewed. No pertinent past medical history.    Past Surgical History:   Procedure Laterality Date     APPENDECTOMY       gastic surgery      for weight loss in        Family History   Problem Relation Age of Onset     Hypertension Mother      Cerebrovascular Disease Mother      Alzheimer Disease Mother      Unknown/Adopted Father          at age 50     Alzheimer Disease Maternal Grandmother      Diabetes Paternal Grandmother      Hypertension Brother      Coronary Artery Disease Brother         MI with stent       No current outpatient medications on file.       No Known Allergies    Pt reports that he has quit smoking. He smoked 0.00 packs per day. He has quit using smokeless tobacco. He reports current alcohol use. He reports that he does not use drugs.    Exam:  BP (!) 144/91 (BP Location: Right arm)   Pulse 80   Temp 97.6  F (36.4  C) (Oral)   Ht 1.765 m (5' 9.5\")   Wt 106.6 kg (235 lb)   SpO2 100%   BMI 34.21 kg/m      Awake, Alert OX3  Lungs - CTA bilaterally  CV - RRR, no murmurs, distal pulses intact  Abd - soft, non-distended, non-tender, +BS  Extr - No cyanosis or edema    A/P 65 year old year old male in need of colonoscopy for personal history of polyps. Risks, benefits, alternatives, and complications were discussed including the possibility of perforation, bleeding, missed lesion and the patient agreed to proceed.    Ryan Dillard, DO on 2021 at 10:01 AM      "

## 2021-06-09 LAB — COPATH REPORT: NORMAL

## (undated) RX ORDER — LIDOCAINE HYDROCHLORIDE 10 MG/ML
INJECTION, SOLUTION EPIDURAL; INFILTRATION; INTRACAUDAL; PERINEURAL
Status: DISPENSED
Start: 2021-06-04

## (undated) RX ORDER — PROPOFOL 10 MG/ML
INJECTION, EMULSION INTRAVENOUS
Status: DISPENSED
Start: 2021-06-04